# Patient Record
Sex: FEMALE | Race: WHITE | Employment: STUDENT | URBAN - METROPOLITAN AREA
[De-identification: names, ages, dates, MRNs, and addresses within clinical notes are randomized per-mention and may not be internally consistent; named-entity substitution may affect disease eponyms.]

---
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
## 2023-01-31 ENCOUNTER — HOSPITAL ENCOUNTER (OUTPATIENT)
Age: 18
Discharge: HOME | End: 2023-01-31
Payer: COMMERCIAL

## 2023-01-31 VITALS
WEIGHT: 115 LBS | TEMPERATURE: 98 F | SYSTOLIC BLOOD PRESSURE: 120 MMHG | BODY MASS INDEX: 21 KG/M2 | RESPIRATION RATE: 18 BRPM | OXYGEN SATURATION: 98 % | DIASTOLIC BLOOD PRESSURE: 58 MMHG | HEART RATE: 100 BPM

## 2023-01-31 DIAGNOSIS — J03.90 TONSILLITIS: ICD-10-CM

## 2023-01-31 DIAGNOSIS — H66.93 BILATERAL OTITIS MEDIA, UNSPECIFIED OTITIS MEDIA TYPE: Primary | ICD-10-CM

## 2023-01-31 LAB
S PYO AG THROAT QL: NEGATIVE
SARS-COV-2 RNA RESP QL NAA+PROBE: NOT DETECTED

## 2023-01-31 PROCEDURE — 99214 OFFICE O/P EST MOD 30 MIN: CPT

## 2023-01-31 PROCEDURE — 87880 STREP A ASSAY W/OPTIC: CPT

## 2023-01-31 PROCEDURE — 99213 OFFICE O/P EST LOW 20 MIN: CPT

## 2023-01-31 RX ORDER — DEXAMETHASONE 4 MG/1
12 TABLET ORAL ONCE
Status: COMPLETED | OUTPATIENT
Start: 2023-01-31 | End: 2023-01-31

## 2023-01-31 RX ORDER — CEFDINIR 300 MG/1
300 CAPSULE ORAL 2 TIMES DAILY
Qty: 20 CAPSULE | Refills: 0 | Status: SHIPPED | OUTPATIENT
Start: 2023-01-31 | End: 2023-02-10

## 2023-01-31 NOTE — DISCHARGE INSTRUCTIONS
Take antibiotic as directed. Tylenol and Motrin alternating. The steroid will stay in your system for the next 3 to 4 days. Salt water gargles and throat lozenges as needed. You may also take Mucinex as needed for congestion and cough.

## 2023-02-23 ENCOUNTER — OFFICE VISIT (OUTPATIENT)
Dept: OBGYN CLINIC | Facility: CLINIC | Age: 18
End: 2023-02-23

## 2023-02-23 VITALS
BODY MASS INDEX: 21.9 KG/M2 | WEIGHT: 119 LBS | HEIGHT: 62 IN | SYSTOLIC BLOOD PRESSURE: 111 MMHG | HEART RATE: 79 BPM | DIASTOLIC BLOOD PRESSURE: 76 MMHG

## 2023-02-23 DIAGNOSIS — Z11.3 SCREEN FOR STD (SEXUALLY TRANSMITTED DISEASE): Primary | ICD-10-CM

## 2023-02-23 PROCEDURE — 3078F DIAST BP <80 MM HG: CPT | Performed by: OBSTETRICS & GYNECOLOGY

## 2023-02-23 PROCEDURE — 3008F BODY MASS INDEX DOCD: CPT | Performed by: OBSTETRICS & GYNECOLOGY

## 2023-02-23 PROCEDURE — 3074F SYST BP LT 130 MM HG: CPT | Performed by: OBSTETRICS & GYNECOLOGY

## 2023-02-23 PROCEDURE — 99213 OFFICE O/P EST LOW 20 MIN: CPT | Performed by: OBSTETRICS & GYNECOLOGY

## 2023-02-24 ENCOUNTER — TELEPHONE (OUTPATIENT)
Dept: OBGYN CLINIC | Facility: CLINIC | Age: 18
End: 2023-02-24

## 2023-02-24 DIAGNOSIS — Z11.3 SCREENING EXAMINATION FOR STD (SEXUALLY TRANSMITTED DISEASE): Primary | ICD-10-CM

## 2023-02-24 LAB
C TRACH DNA SPEC QL NAA+PROBE: NEGATIVE
N GONORRHOEA DNA SPEC QL NAA+PROBE: NEGATIVE

## 2023-02-24 RX ORDER — METRONIDAZOLE 7.5 MG/G
1 GEL VAGINAL NIGHTLY
Qty: 70 G | Refills: 0 | Status: SHIPPED | OUTPATIENT
Start: 2023-02-24 | End: 2023-03-03

## 2023-02-24 RX ORDER — FLUCONAZOLE 150 MG/1
150 TABLET ORAL ONCE
Qty: 1 TABLET | Refills: 0 | Status: SHIPPED | OUTPATIENT
Start: 2023-02-24 | End: 2023-02-24

## 2023-02-24 NOTE — TELEPHONE ENCOUNTER
Pt Name and  verified. Patient informed and verbalized understanding. Scripts sent to confirmed pharmacy.

## 2023-02-24 NOTE — TELEPHONE ENCOUNTER
----- Message from Mikle Kehr, MD sent at 2/24/2023 12:52 PM CST -----  Patient has BV and YEAST - treat with metrogel pv x 7 days or flagyl 500mg po bid x 7 days (avoid alcohol with oral treatment). Either rx per patient preference. Patient also has yeast -  Give Rx Diflucan unless in the first trimester then have patient take otc monistat 7 day.      Mikle Kehr, MD

## 2023-05-22 ENCOUNTER — HOSPITAL ENCOUNTER (OUTPATIENT)
Age: 18
Discharge: HOME | End: 2023-05-22
Attending: EMERGENCY MEDICINE
Payer: COMMERCIAL

## 2023-05-22 ENCOUNTER — APPOINTMENT (OUTPATIENT)
Dept: GENERAL RADIOLOGY | Age: 18
End: 2023-05-22
Attending: EMERGENCY MEDICINE
Payer: COMMERCIAL

## 2023-05-22 VITALS
HEIGHT: 62 IN | DIASTOLIC BLOOD PRESSURE: 83 MMHG | BODY MASS INDEX: 22.08 KG/M2 | TEMPERATURE: 98 F | OXYGEN SATURATION: 100 % | RESPIRATION RATE: 15 BRPM | SYSTOLIC BLOOD PRESSURE: 123 MMHG | HEART RATE: 71 BPM | WEIGHT: 120 LBS

## 2023-05-22 DIAGNOSIS — S60.00XA CONTUSION OF FINGER OF LEFT HAND, UNSPECIFIED FINGER, INITIAL ENCOUNTER: Primary | ICD-10-CM

## 2023-05-22 DIAGNOSIS — R55 SYNCOPE, VASOVAGAL: ICD-10-CM

## 2023-05-22 LAB
#MXD IC: 0.6 X10ˆ3/UL (ref 0.1–1)
BUN BLD-MCNC: 11 MG/DL (ref 7–18)
CHLORIDE BLD-SCNC: 99 MMOL/L (ref 98–112)
CO2 BLD-SCNC: 31 MMOL/L (ref 21–32)
CREAT BLD-MCNC: 1.1 MG/DL
GFR SERPLBLD BASED ON 1.73 SQ M-ARVRAT: 74 ML/MIN/1.73M2 (ref 60–?)
GLUCOSE BLD-MCNC: 86 MG/DL (ref 70–99)
HCT VFR BLD AUTO: 45.1 %
HCT VFR BLD CALC: 45 %
HGB BLD-MCNC: 14.4 G/DL
ISTAT IONIZED CALCIUM FOR CHEM 8: 1.27 MMOL/L (ref 1.12–1.32)
LYMPHOCYTES # BLD AUTO: 1.7 X10ˆ3/UL (ref 1.5–5)
LYMPHOCYTES NFR BLD AUTO: 19.6 %
MCH RBC QN AUTO: 29.8 PG (ref 26–34)
MCHC RBC AUTO-ENTMCNC: 31.9 G/DL (ref 31–37)
MCV RBC AUTO: 93.2 FL (ref 80–100)
MIXED CELL %: 7.1 %
NEUTROPHILS # BLD AUTO: 6.4 X10ˆ3/UL (ref 1.5–7.7)
NEUTROPHILS NFR BLD AUTO: 73.3 %
PLATELET # BLD AUTO: 180 X10ˆ3/UL (ref 150–450)
POTASSIUM BLD-SCNC: 3.8 MMOL/L (ref 3.6–5.1)
RBC # BLD AUTO: 4.84 X10ˆ6/UL
SODIUM BLD-SCNC: 139 MMOL/L (ref 136–145)
WBC # BLD AUTO: 8.7 X10ˆ3/UL (ref 4–11)

## 2023-05-22 PROCEDURE — 80047 BASIC METABLC PNL IONIZED CA: CPT

## 2023-05-22 PROCEDURE — 73140 X-RAY EXAM OF FINGER(S): CPT | Performed by: EMERGENCY MEDICINE

## 2023-05-22 PROCEDURE — 93005 ELECTROCARDIOGRAM TRACING: CPT

## 2023-05-22 PROCEDURE — 85025 COMPLETE CBC W/AUTO DIFF WBC: CPT | Performed by: EMERGENCY MEDICINE

## 2023-05-23 LAB
ATRIAL RATE: 61 BPM
P AXIS: 38 DEGREES
P-R INTERVAL: 174 MS
Q-T INTERVAL: 398 MS
QRS DURATION: 88 MS
QTC CALCULATION (BEZET): 400 MS
R AXIS: 98 DEGREES
T AXIS: 63 DEGREES
VENTRICULAR RATE: 61 BPM

## 2023-05-23 NOTE — DISCHARGE INSTRUCTIONS
Push fluids  Rest  Elevate your injured extremity  Apply cool compresses for 20 minutes at a time.   Tylenol or motrin for pain    Follow up with your doctor within a few days
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
## 2023-06-22 ENCOUNTER — OFFICE VISIT (OUTPATIENT)
Dept: OBGYN CLINIC | Facility: CLINIC | Age: 18
End: 2023-06-22

## 2023-06-22 ENCOUNTER — LAB ENCOUNTER (OUTPATIENT)
Dept: LAB | Age: 18
End: 2023-06-22
Attending: OBSTETRICS & GYNECOLOGY
Payer: COMMERCIAL

## 2023-06-22 VITALS
SYSTOLIC BLOOD PRESSURE: 115 MMHG | DIASTOLIC BLOOD PRESSURE: 74 MMHG | WEIGHT: 113.81 LBS | BODY MASS INDEX: 20.94 KG/M2 | HEIGHT: 62 IN

## 2023-06-22 DIAGNOSIS — Z11.3 SCREEN FOR STD (SEXUALLY TRANSMITTED DISEASE): Primary | ICD-10-CM

## 2023-06-22 DIAGNOSIS — N89.8 VAGINAL DISCHARGE: ICD-10-CM

## 2023-06-22 DIAGNOSIS — Z11.3 SCREEN FOR STD (SEXUALLY TRANSMITTED DISEASE): ICD-10-CM

## 2023-06-22 LAB
HBV SURFACE AG SER-ACNC: <0.1 [IU]/L
HBV SURFACE AG SERPL QL IA: NONREACTIVE
HCV AB SERPL QL IA: NONREACTIVE
T PALLIDUM AB SER QL IA: NONREACTIVE

## 2023-06-22 PROCEDURE — 3008F BODY MASS INDEX DOCD: CPT | Performed by: OBSTETRICS & GYNECOLOGY

## 2023-06-22 PROCEDURE — 87340 HEPATITIS B SURFACE AG IA: CPT

## 2023-06-22 PROCEDURE — 99213 OFFICE O/P EST LOW 20 MIN: CPT | Performed by: OBSTETRICS & GYNECOLOGY

## 2023-06-22 PROCEDURE — 87591 N.GONORRHOEAE DNA AMP PROB: CPT

## 2023-06-22 PROCEDURE — 3074F SYST BP LT 130 MM HG: CPT | Performed by: OBSTETRICS & GYNECOLOGY

## 2023-06-22 PROCEDURE — 87491 CHLMYD TRACH DNA AMP PROBE: CPT

## 2023-06-22 PROCEDURE — 87210 SMEAR WET MOUNT SALINE/INK: CPT | Performed by: OBSTETRICS & GYNECOLOGY

## 2023-06-22 PROCEDURE — 86780 TREPONEMA PALLIDUM: CPT

## 2023-06-22 PROCEDURE — 87389 HIV-1 AG W/HIV-1&-2 AB AG IA: CPT

## 2023-06-22 PROCEDURE — 3078F DIAST BP <80 MM HG: CPT | Performed by: OBSTETRICS & GYNECOLOGY

## 2023-06-22 PROCEDURE — 86803 HEPATITIS C AB TEST: CPT

## 2023-06-23 LAB
C TRACH DNA SPEC QL NAA+PROBE: NEGATIVE
N GONORRHOEA DNA SPEC QL NAA+PROBE: NEGATIVE

## 2023-06-29 ENCOUNTER — TELEPHONE (OUTPATIENT)
Dept: OBGYN CLINIC | Facility: CLINIC | Age: 18
End: 2023-06-29

## 2023-06-29 RX ORDER — METRONIDAZOLE 7.5 MG/G
1 GEL VAGINAL NIGHTLY
Qty: 70 G | Refills: 0 | Status: SHIPPED | OUTPATIENT
Start: 2023-06-29 | End: 2023-07-06

## 2023-07-06 ENCOUNTER — HOSPITAL ENCOUNTER (EMERGENCY)
Facility: HOSPITAL | Age: 18
Discharge: HOME | End: 2023-07-06
Attending: EMERGENCY MEDICINE
Payer: COMMERCIAL

## 2023-07-06 VITALS
DIASTOLIC BLOOD PRESSURE: 84 MMHG | HEART RATE: 88 BPM | SYSTOLIC BLOOD PRESSURE: 127 MMHG | OXYGEN SATURATION: 100 % | TEMPERATURE: 98 F | WEIGHT: 110 LBS | BODY MASS INDEX: 20 KG/M2 | RESPIRATION RATE: 18 BRPM

## 2023-07-06 DIAGNOSIS — L25.9 CONTACT DERMATITIS, UNSPECIFIED CONTACT DERMATITIS TYPE, UNSPECIFIED TRIGGER: Primary | ICD-10-CM

## 2023-07-06 DIAGNOSIS — L50.9 URTICARIA: ICD-10-CM

## 2023-07-06 PROCEDURE — 96372 THER/PROPH/DIAG INJ SC/IM: CPT

## 2023-07-06 PROCEDURE — 99283 EMERGENCY DEPT VISIT LOW MDM: CPT

## 2023-07-06 PROCEDURE — 99285 EMERGENCY DEPT VISIT HI MDM: CPT

## 2023-07-06 RX ORDER — METHYLPREDNISOLONE 4 MG/1
TABLET ORAL
Qty: 21 TABLET | Refills: 0 | Status: SHIPPED | OUTPATIENT
Start: 2023-07-06

## 2023-07-06 RX ORDER — DEXAMETHASONE 4 MG/1
16 TABLET ORAL ONCE
Status: COMPLETED | OUTPATIENT
Start: 2023-07-06 | End: 2023-07-06

## 2023-07-06 RX ORDER — CETIRIZINE HYDROCHLORIDE 10 MG/1
10 TABLET ORAL ONCE
Status: COMPLETED | OUTPATIENT
Start: 2023-07-06 | End: 2023-07-06

## 2023-07-06 RX ORDER — FAMOTIDINE 20 MG/1
20 TABLET, FILM COATED ORAL ONCE
Status: COMPLETED | OUTPATIENT
Start: 2023-07-06 | End: 2023-07-06

## 2023-07-06 RX ORDER — CETIRIZINE HYDROCHLORIDE 10 MG/1
10 TABLET ORAL DAILY
Qty: 30 TABLET | Refills: 0 | Status: SHIPPED | OUTPATIENT
Start: 2023-07-06 | End: 2023-08-05

## 2023-07-06 NOTE — DISCHARGE INSTRUCTIONS
Zyrtec 10 mg twice a day for the next 3 days then daily as needed. Medrol Dosepak use as directed. Start tomorrow morning. Go home and bathe and wash all the skin irrigate with clean water to be sure there is no residual chemical contact. Follow-up with PMD if not improved in 48 hours. Return immediately if symptoms worsen or other concerns develop.

## 2023-07-06 NOTE — ED INITIAL ASSESSMENT (HPI)
Unknown allergic reaction . Currently in cosmetology school and thinks she may have gotten bleach on her skin.

## 2023-07-27 ENCOUNTER — OFFICE VISIT (OUTPATIENT)
Dept: INTERNAL MEDICINE CLINIC | Facility: CLINIC | Age: 18
End: 2023-07-27
Payer: COMMERCIAL

## 2023-07-27 VITALS
SYSTOLIC BLOOD PRESSURE: 96 MMHG | DIASTOLIC BLOOD PRESSURE: 64 MMHG | HEART RATE: 79 BPM | TEMPERATURE: 98 F | HEIGHT: 62 IN | WEIGHT: 116.38 LBS | RESPIRATION RATE: 14 BRPM | BODY MASS INDEX: 21.42 KG/M2 | OXYGEN SATURATION: 98 %

## 2023-07-27 DIAGNOSIS — R11.2 NAUSEA AND VOMITING, UNSPECIFIED VOMITING TYPE: Primary | ICD-10-CM

## 2023-07-27 DIAGNOSIS — F43.21 GRIEVING: ICD-10-CM

## 2023-07-27 DIAGNOSIS — F41.9 ANXIETY: ICD-10-CM

## 2023-07-27 PROCEDURE — 99214 OFFICE O/P EST MOD 30 MIN: CPT | Performed by: FAMILY MEDICINE

## 2023-07-27 PROCEDURE — 3078F DIAST BP <80 MM HG: CPT | Performed by: FAMILY MEDICINE

## 2023-07-27 PROCEDURE — 3008F BODY MASS INDEX DOCD: CPT | Performed by: FAMILY MEDICINE

## 2023-07-27 PROCEDURE — 3074F SYST BP LT 130 MM HG: CPT | Performed by: FAMILY MEDICINE

## 2023-07-27 RX ORDER — PANTOPRAZOLE SODIUM 40 MG/1
TABLET, DELAYED RELEASE ORAL
Qty: 90 TABLET | Refills: 0 | Status: SHIPPED | OUTPATIENT
Start: 2023-07-27

## 2023-07-27 RX ORDER — ONDANSETRON 4 MG/1
4 TABLET, ORALLY DISINTEGRATING ORAL EVERY 4 HOURS PRN
Qty: 30 TABLET | Refills: 0 | Status: SHIPPED | OUTPATIENT
Start: 2023-07-27

## 2023-09-25 ENCOUNTER — HOSPITAL ENCOUNTER (OUTPATIENT)
Age: 18
Discharge: HOME | End: 2023-09-25
Payer: COMMERCIAL

## 2023-09-25 VITALS
TEMPERATURE: 98 F | WEIGHT: 120 LBS | RESPIRATION RATE: 18 BRPM | HEART RATE: 88 BPM | OXYGEN SATURATION: 99 % | SYSTOLIC BLOOD PRESSURE: 135 MMHG | BODY MASS INDEX: 22 KG/M2 | DIASTOLIC BLOOD PRESSURE: 72 MMHG

## 2023-09-25 DIAGNOSIS — B34.9 VIRAL SYNDROME: Primary | ICD-10-CM

## 2023-09-25 LAB — SARS-COV-2 RNA RESP QL NAA+PROBE: NOT DETECTED

## 2023-09-25 PROCEDURE — 99212 OFFICE O/P EST SF 10 MIN: CPT

## 2023-09-25 PROCEDURE — 99213 OFFICE O/P EST LOW 20 MIN: CPT

## 2023-10-08 RX ORDER — ALBUTEROL SULFATE 90 UG/1
2 AEROSOL, METERED RESPIRATORY (INHALATION) EVERY 4 HOURS PRN
Qty: 1 EACH | Refills: 0 | Status: SHIPPED | OUTPATIENT
Start: 2023-10-08

## 2023-12-23 ENCOUNTER — HOSPITAL ENCOUNTER (OUTPATIENT)
Age: 18
Discharge: HOME | End: 2023-12-23
Attending: EMERGENCY MEDICINE
Payer: COMMERCIAL

## 2023-12-23 VITALS
OXYGEN SATURATION: 97 % | HEART RATE: 66 BPM | HEIGHT: 62 IN | TEMPERATURE: 98 F | BODY MASS INDEX: 22.08 KG/M2 | DIASTOLIC BLOOD PRESSURE: 80 MMHG | WEIGHT: 120 LBS | RESPIRATION RATE: 18 BRPM | SYSTOLIC BLOOD PRESSURE: 128 MMHG

## 2023-12-23 DIAGNOSIS — J01.00 ACUTE MAXILLARY SINUSITIS, RECURRENCE NOT SPECIFIED: Primary | ICD-10-CM

## 2023-12-23 LAB — SARS-COV-2 RNA RESP QL NAA+PROBE: NOT DETECTED

## 2023-12-23 PROCEDURE — 99214 OFFICE O/P EST MOD 30 MIN: CPT

## 2023-12-23 PROCEDURE — 99213 OFFICE O/P EST LOW 20 MIN: CPT

## 2023-12-23 RX ORDER — AZITHROMYCIN 250 MG/1
TABLET, FILM COATED ORAL
Qty: 6 TABLET | Refills: 0 | Status: SHIPPED | OUTPATIENT
Start: 2023-12-23 | End: 2023-12-28

## 2023-12-23 NOTE — DISCHARGE INSTRUCTIONS
Follow-up with your primary care doctor as needed  Take over-the-counter decongestant as needed  Take Zithromax for course of 5 days  Return if any worsening symptoms or new concerns

## 2023-12-23 NOTE — ED INITIAL ASSESSMENT (HPI)
C/o sinus problem for 2 days. Pt reports runny/stuffy nose, productive cough with yellow sputum, chest congestion. Pt denies fever, anyone else sick, travel and otc meds.

## 2024-02-09 ENCOUNTER — LAB ENCOUNTER (OUTPATIENT)
Dept: LAB | Age: 19
End: 2024-02-09
Payer: COMMERCIAL

## 2024-02-09 ENCOUNTER — OFFICE VISIT (OUTPATIENT)
Dept: OBGYN CLINIC | Facility: CLINIC | Age: 19
End: 2024-02-09
Payer: COMMERCIAL

## 2024-02-09 VITALS
HEIGHT: 62 IN | HEART RATE: 72 BPM | SYSTOLIC BLOOD PRESSURE: 118 MMHG | WEIGHT: 112.44 LBS | DIASTOLIC BLOOD PRESSURE: 77 MMHG | BODY MASS INDEX: 20.69 KG/M2

## 2024-02-09 DIAGNOSIS — Z11.3 SCREENING EXAMINATION FOR STI: ICD-10-CM

## 2024-02-09 DIAGNOSIS — Z11.3 SCREENING EXAMINATION FOR STI: Primary | ICD-10-CM

## 2024-02-09 PROCEDURE — 87491 CHLMYD TRACH DNA AMP PROBE: CPT

## 2024-02-09 PROCEDURE — 87340 HEPATITIS B SURFACE AG IA: CPT

## 2024-02-09 PROCEDURE — 87389 HIV-1 AG W/HIV-1&-2 AB AG IA: CPT

## 2024-02-09 PROCEDURE — 86780 TREPONEMA PALLIDUM: CPT

## 2024-02-09 PROCEDURE — 87591 N.GONORRHOEAE DNA AMP PROB: CPT

## 2024-02-09 PROCEDURE — 86803 HEPATITIS C AB TEST: CPT

## 2024-02-09 NOTE — PROGRESS NOTES
Tere Burnett is a 18 year old female  No LMP recorded (lmp unknown). (Menstrual status: IUD - Intrauterine Device).   Chief Complaint   Patient presents with    New Patient    Std Screen     Unprotected sex recently, about a week ago     Vaginal Problem     Discharge and odor     Other     Unable to leave urine sample for GC/Chlam    .    OBSTETRICS HISTORY:  OB History    Para Term  AB Living   0 0 0 0 0 0   SAB IAB Ectopic Multiple Live Births   0 0 0 0 0       GYNE HISTORY:  Periods  none due to IUD      History   Sexual Activity    Sexual activity: Yes        Hx Prior Abnormal Pap: No        MEDICAL HISTORY:  Past Medical History:   Diagnosis Date    Abdominal distention     Abdominal pain     Anxiety     Arthritis     Back pain     Bloating     Body piercing     Chronic rhinitis     Constipation     Decorative tattoo     Diarrhea, unspecified     Endometriosis     Esophageal reflux     Food intolerance     Frequent urination     Frequent UTI     Heartburn     History of cardiac murmur     Indigestion     Irregular bowel habits     Kidney problem     currently under care of a urologist for Kidney reflux.     Leaking of urine     Loss of appetite     Menses painful     Nausea     Nausea and vomiting 12/10/2021    Pain in joints     Pain with bowel movements     Painful urination     Presence of other cardiac implants and grafts     Iud    Stool incontinence     Stress     Uncomfortable fullness after meals     Visual impairment     glasses    Vomiting     Wears glasses     Weight loss     Wheezing     When exercising       SURGICAL HISTORY:  Past Surgical History:   Procedure Laterality Date    Colonoscopy      Correct bunion,simple      Insert intrauterine device  10/12/2022    Kyleena    Other surgical history      Bilateral Bunionectomy    Other surgical history  10/26/2020    rbus Dr. Figueroa    Remove intrauterine device  10/12/2022    Shelly moctezuma       SOCIAL HISTORY:  Social  History     Socioeconomic History    Marital status: Single     Spouse name: Not on file    Number of children: Not on file    Years of education: Not on file    Highest education level: Not on file   Occupational History    Not on file   Tobacco Use    Smoking status: Never    Smokeless tobacco: Former   Vaping Use    Vaping Use: Every day   Substance and Sexual Activity    Alcohol use: Not Currently     Alcohol/week: 0.0 standard drinks of alcohol     Comment: Occasionally/ 2-3 seltzers when she does drink    Drug use: Yes     Frequency: 7.0 times per week     Types: Cannabis     Comment: everyday    Sexual activity: Yes   Other Topics Concern    Caffeine Concern Yes    Exercise Yes    Seat Belt Yes    Special Diet Not Asked    Stress Concern Not Asked    Weight Concern Not Asked     Service Not Asked    Blood Transfusions Not Asked    Occupational Exposure Not Asked    Hobby Hazards Not Asked    Sleep Concern Not Asked    Back Care Not Asked    Bike Helmet Not Asked    Self-Exams Not Asked   Social History Narrative    Not on file     Social Determinants of Health     Financial Resource Strain: Not on file   Food Insecurity: Not on file   Transportation Needs: Not on file   Physical Activity: Not on file   Stress: Not on file   Social Connections: Not on file   Housing Stability: Not on file       FAMILY HISTORY:  Family History   Problem Relation Age of Onset    Heart Disorder Maternal Grandmother     Heart Disorder Maternal Grandfather     Heart Disease Maternal Grandfather     Heart Attack Maternal Grandfather     Cancer Paternal Grandmother     Other Paternal Grandmother         Cervical cancer    Stroke Neg        MEDICATIONS:    Current Outpatient Medications:     albuterol 108 (90 Base) MCG/ACT Inhalation Aero Soln, Inhale 2 puffs into the lungs every 4 (four) hours as needed for Wheezing. PHYSICAL DUE, Disp: 1 each, Rfl: 0    ondansetron 4 MG Oral Tablet Dispersible, Take 1 tablet (4 mg total) by  mouth every 4 (four) hours as needed., Disp: 30 tablet, Rfl: 0    pantoprazole 40 MG Oral Tab EC, Take one tablet (40 mg total) by mouth once daily, 30 minutes prior to breakfast., Disp: 90 tablet, Rfl: 0    levonorgestrel (KYLEENA) 19.5 MG Intrauterine IUD, 19,500 mcg (1 each total) by Intrauterine route one time., Disp: , Rfl:     linaCLOtide (LINZESS) 72 MCG Oral Cap, Take 72 mcg by mouth daily., Disp: 90 capsule, Rfl: 0    ALLERGIES:    Allergies   Allergen Reactions    Penicillins RASH     Rash (not hives) with PCN; has taken a cephalosporin with no problem    Bananas ITCHING     Tongue itching    Pineapple ITCHING     Itchy tongue    Strawberries ITCHING     Itchy tongue         PHYSICAL EXAM:   Pelvic Exam:  External Genitalia: normal appearance, hair distribution, and no lesions  Urethral Meatus:  normal in size, location, without lesions and prolapse  Bladder:  No fullness, masses or tenderness  Vagina:  Normal appearance without lesions, no abnormal discharge  Cervix:  Normal without tenderness on motion  Uterus: normal in size, contour, position, mobility, without tenderness  Adnexa: normal without masses or tenderness  Perineum: normal  Anus: no hemorroids     Assessment & Plan:    1. Screening examination for STI    - Chlamydia/Gc Amplification; Future  - HIV AG AB COMBO; Future  - Hepatitis B Surface Antigen; Future  - T Pallidum Screening Cascade; Future  - HCV Antibody; Future

## 2024-02-12 LAB
C TRACH DNA SPEC QL NAA+PROBE: NEGATIVE
N GONORRHOEA DNA SPEC QL NAA+PROBE: NEGATIVE

## 2024-05-06 ENCOUNTER — OFFICE VISIT (OUTPATIENT)
Dept: OBGYN CLINIC | Facility: CLINIC | Age: 19
End: 2024-05-06
Payer: COMMERCIAL

## 2024-05-06 ENCOUNTER — LAB ENCOUNTER (OUTPATIENT)
Dept: LAB | Age: 19
End: 2024-05-06
Payer: COMMERCIAL

## 2024-05-06 VITALS
BODY MASS INDEX: 19.47 KG/M2 | SYSTOLIC BLOOD PRESSURE: 122 MMHG | DIASTOLIC BLOOD PRESSURE: 77 MMHG | HEIGHT: 62 IN | WEIGHT: 105.81 LBS | HEART RATE: 65 BPM

## 2024-05-06 DIAGNOSIS — Z11.3 SCREENING EXAMINATION FOR STI: ICD-10-CM

## 2024-05-06 DIAGNOSIS — N89.8 VAGINAL DISCHARGE: Primary | ICD-10-CM

## 2024-05-06 LAB
HBV SURFACE AB SER QL: NONREACTIVE
HBV SURFACE AB SERPL IA-ACNC: 4.27 MIU/ML
HCV AB SERPL QL IA: NONREACTIVE
T PALLIDUM AB SER QL IA: NONREACTIVE

## 2024-05-06 PROCEDURE — 86803 HEPATITIS C AB TEST: CPT

## 2024-05-06 PROCEDURE — 87591 N.GONORRHOEAE DNA AMP PROB: CPT

## 2024-05-06 PROCEDURE — 87491 CHLMYD TRACH DNA AMP PROBE: CPT

## 2024-05-06 PROCEDURE — 81514 NFCT DS BV&VAGINITIS DNA ALG: CPT

## 2024-05-06 PROCEDURE — 87389 HIV-1 AG W/HIV-1&-2 AB AG IA: CPT

## 2024-05-06 PROCEDURE — 86780 TREPONEMA PALLIDUM: CPT

## 2024-05-06 PROCEDURE — 86706 HEP B SURFACE ANTIBODY: CPT

## 2024-05-06 NOTE — PROGRESS NOTES
Tere Burnett is a 18 year old female  No LMP recorded (lmp unknown). (Menstrual status: IUD - Intrauterine Device).   Chief Complaint   Patient presents with    Sexually Transmitted Infection Screen     Was tested for STDs back in February but patient wants to be tested again for everything     Vaginal Problem     Unusual discharge, wants to be tested for BV and yeast     Other     Patient said YES to student in the room    .  She is requesting blood work STI testing as well.     OBSTETRICS HISTORY:  OB History    Para Term  AB Living   0 0 0 0 0 0   SAB IAB Ectopic Multiple Live Births   0 0 0 0 0       GYNE HISTORY:    History   Sexual Activity    Sexual activity: Yes                 MEDICAL HISTORY:  Past Medical History:    Abdominal distention    Abdominal pain    Anxiety    Arthritis    Back pain    Bloating    Body piercing    Chronic rhinitis    Constipation    Decorative tattoo    Diarrhea, unspecified    Endometriosis    Esophageal reflux    Food intolerance    Frequent urination    Frequent UTI    Heartburn    History of cardiac murmur    Indigestion    Irregular bowel habits    Kidney problem    currently under care of a urologist for Kidney reflux.     Leaking of urine    Loss of appetite    Menses painful    Nausea    Nausea and vomiting    Pain in joints    Pain with bowel movements    Painful urination    Presence of other cardiac implants and grafts    Iud    Stool incontinence    Stress    Uncomfortable fullness after meals    Visual impairment    glasses    Vomiting    Wears glasses    Weight loss    Wheezing    When exercising       SURGICAL HISTORY:  Past Surgical History:   Procedure Laterality Date    Colonoscopy      Correct bunion,simple      Insert intrauterine device  10/12/2022    Kyleena    Other surgical history      Bilateral Bunionectomy    Other surgical history  10/26/2020    rbus Dr. Figueroa    Remove intrauterine device  10/12/2022    Shelly removed       SOCIAL  HISTORY:  Social History     Socioeconomic History    Marital status: Single     Spouse name: Not on file    Number of children: Not on file    Years of education: Not on file    Highest education level: Not on file   Occupational History    Not on file   Tobacco Use    Smoking status: Never    Smokeless tobacco: Former   Vaping Use    Vaping status: Every Day   Substance and Sexual Activity    Alcohol use: Not Currently     Alcohol/week: 0.0 standard drinks of alcohol     Comment: Occasionally/ 2-3 seltzers when she does drink    Drug use: Yes     Frequency: 7.0 times per week     Types: Cannabis     Comment: everyday    Sexual activity: Yes   Other Topics Concern    Caffeine Concern Yes    Exercise Yes    Seat Belt Yes    Special Diet Not Asked    Stress Concern Not Asked    Weight Concern Not Asked     Service Not Asked    Blood Transfusions Not Asked    Occupational Exposure Not Asked    Hobby Hazards Not Asked    Sleep Concern Not Asked    Back Care Not Asked    Bike Helmet Not Asked    Self-Exams Not Asked   Social History Narrative    Not on file     Social Determinants of Health     Financial Resource Strain: Not on file   Food Insecurity: Not on file   Transportation Needs: Not on file   Physical Activity: Not on file   Stress: Not on file   Social Connections: Not on file   Housing Stability: At Risk (8/18/2023)    Received from [facility]     Living Situation: Not on file     Housing Problems: Not on file       FAMILY HISTORY:  Family History   Problem Relation Age of Onset    Heart Disorder Maternal Grandmother     Heart Disorder Maternal Grandfather     Heart Disease Maternal Grandfather     Heart Attack Maternal Grandfather     Cancer Paternal Grandmother     Other Paternal Grandmother         Cervical cancer    Stroke Neg        MEDICATIONS:    Current Outpatient Medications:     albuterol 108 (90 Base) MCG/ACT Inhalation Aero Soln, Inhale 2 puffs into the lungs every 4 (four)  hours as needed for Wheezing. PHYSICAL DUE, Disp: 1 each, Rfl: 0    ondansetron 4 MG Oral Tablet Dispersible, Take 1 tablet (4 mg total) by mouth every 4 (four) hours as needed., Disp: 30 tablet, Rfl: 0    pantoprazole 40 MG Oral Tab EC, Take one tablet (40 mg total) by mouth once daily, 30 minutes prior to breakfast., Disp: 90 tablet, Rfl: 0    levonorgestrel (KYLEENA) 19.5 MG Intrauterine IUD, 19,500 mcg (1 each total) by Intrauterine route one time., Disp: , Rfl:     linaCLOtide (LINZESS) 72 MCG Oral Cap, Take 72 mcg by mouth daily., Disp: 90 capsule, Rfl: 0    ALLERGIES:    Allergies   Allergen Reactions    Penicillins RASH     Rash (not hives) with PCN; has taken a cephalosporin with no problem    Bananas ITCHING     Tongue itching    Pineapple ITCHING     Itchy tongue    Strawberries ITCHING     Itchy tongue         PHYSICAL EXAM:   Pelvic Exam:  External Genitalia: normal appearance, hair distribution, and no lesions  Urethral Meatus:  normal in size, location, without lesions and prolapse  Bladder:  No fullness, masses or tenderness  Vagina:  Normal appearance without lesions, no abnormal discharge  Cervix:  Normal without tenderness on motion  Uterus: normal in size, contour, position, mobility, without tenderness  Adnexa: normal without masses or tenderness  Perineum: normal  Anus: no hemorroids     Assessment & Plan:  . Vaginal discharge    - Vaginitis Vaginosis PCR Panel; Future    2. Screening examination for STI    - Chlamydia/Gc Amplification; Future  - HIV AG AB COMBO; Future  - T Pallidum Screening Cascade; Future  - Hepatitis B Surface Antibody; Future  - HCV Antibody; Future

## 2024-05-07 LAB
BV BACTERIA DNA VAG QL NAA+PROBE: POSITIVE
C GLABRATA DNA VAG QL NAA+PROBE: NEGATIVE
C KRUSEI DNA VAG QL NAA+PROBE: NEGATIVE
C TRACH DNA SPEC QL NAA+PROBE: NEGATIVE
CANDIDA DNA VAG QL NAA+PROBE: NEGATIVE
N GONORRHOEA DNA SPEC QL NAA+PROBE: NEGATIVE
T VAGINALIS DNA VAG QL NAA+PROBE: POSITIVE

## 2024-05-07 NOTE — PROGRESS NOTES
Pt aware of results & recommendations to follow up with PCP her hepatitis B vaccine. Verbalized understanding.

## 2024-05-08 RX ORDER — METRONIDAZOLE 500 MG/1
500 TABLET ORAL 2 TIMES DAILY
Qty: 14 TABLET | Refills: 0 | Status: SHIPPED | OUTPATIENT
Start: 2024-05-08

## 2024-11-25 ENCOUNTER — OFFICE VISIT (OUTPATIENT)
Dept: OBGYN CLINIC | Facility: CLINIC | Age: 19
End: 2024-11-25
Payer: COMMERCIAL

## 2024-11-25 ENCOUNTER — LAB ENCOUNTER (OUTPATIENT)
Dept: LAB | Age: 19
End: 2024-11-25
Payer: COMMERCIAL

## 2024-11-25 VITALS
BODY MASS INDEX: 20.18 KG/M2 | SYSTOLIC BLOOD PRESSURE: 112 MMHG | DIASTOLIC BLOOD PRESSURE: 76 MMHG | WEIGHT: 109.63 LBS | HEART RATE: 81 BPM | HEIGHT: 62 IN

## 2024-11-25 DIAGNOSIS — N89.8 VAGINAL DISCHARGE: ICD-10-CM

## 2024-11-25 DIAGNOSIS — Z11.3 SCREENING EXAMINATION FOR STI: ICD-10-CM

## 2024-11-25 DIAGNOSIS — N63.15 BREAST LUMP ON RIGHT SIDE AT 9 O'CLOCK POSITION: Primary | ICD-10-CM

## 2024-11-25 LAB
HBV SURFACE AB SER QL: NONREACTIVE
HBV SURFACE AB SERPL IA-ACNC: 3.92 MIU/ML
HCV AB SERPL QL IA: NONREACTIVE
T PALLIDUM AB SER QL IA: NONREACTIVE

## 2024-11-25 PROCEDURE — 87389 HIV-1 AG W/HIV-1&-2 AB AG IA: CPT

## 2024-11-25 PROCEDURE — 86803 HEPATITIS C AB TEST: CPT

## 2024-11-25 PROCEDURE — 87491 CHLMYD TRACH DNA AMP PROBE: CPT

## 2024-11-25 PROCEDURE — 86706 HEP B SURFACE ANTIBODY: CPT

## 2024-11-25 PROCEDURE — 81514 NFCT DS BV&VAGINITIS DNA ALG: CPT

## 2024-11-25 PROCEDURE — 87591 N.GONORRHOEAE DNA AMP PROB: CPT

## 2024-11-25 PROCEDURE — 86780 TREPONEMA PALLIDUM: CPT

## 2024-11-25 NOTE — PROGRESS NOTES
Tere Burnett is a 19 year old female  No LMP recorded (lmp unknown). (Menstrual status: IUD - Intrauterine Device).   Chief Complaint   Patient presents with    Sexually Transmitted Infection Screen     - Patient would like STD testing today     Gyn Problem     Patient reports increase in discharge, sometimes a little pinkish for about 1-2 weeks    Breast Problem     Patient reports left breast is larger than right breast, noticed it more recently after losing weight.    .    OBSTETRICS HISTORY:  OB History    Para Term  AB Living   0 0 0 0 0 0   SAB IAB Ectopic Multiple Live Births   0 0 0 0 0       GYNE HISTORY:      History   Sexual Activity    Sexual activity: Yes                 MEDICAL HISTORY:  Past Medical History:    Abdominal distention    Abdominal pain    Abnormal uterine bleeding    Amenorrhea    Iud    Anxiety    Arthritis    Back pain    Bloating    Body piercing    Chronic rhinitis    Constipation    Decorative tattoo    Diarrhea, unspecified    Dyspareunia    Endometriosis    Esophageal reflux    Food intolerance    Frequent urination    Frequent UTI    Heart murmur    Resolved at 3 months    Heartburn    History of cardiac murmur    Indigestion    Irregular bowel habits    Kidney problem    currently under care of a urologist for Kidney reflux.     Leaking of urine    Loss of appetite    Menses painful    Nausea    Nausea and vomiting    Pain in joints    Pain with bowel movements    Painful urination    Presence of other cardiac implants and grafts    Iud    Stool incontinence    Stress    Uncomfortable fullness after meals    Visual impairment    glasses    Vomiting    Wears glasses    Weight loss    Wheezing    When exercising       SURGICAL HISTORY:  Past Surgical History:   Procedure Laterality Date    Colonoscopy      Correct bunion,simple      Insert intrauterine device  10/12/2022    Kyleena    Other surgical history      Bilateral Bunionectomy    Other surgical  history  10/26/2020    rbus Dr. Figueroa    Remove intrauterine device  10/12/2022    Shelly removed       SOCIAL HISTORY:  Social History     Socioeconomic History    Marital status: Single     Spouse name: Not on file    Number of children: Not on file    Years of education: Not on file    Highest education level: Not on file   Occupational History    Not on file   Tobacco Use    Smoking status: Never    Smokeless tobacco: Former   Vaping Use    Vaping status: Every Day   Substance and Sexual Activity    Alcohol use: Not Currently     Comment: Occasionally/ 2-3 seltzers when she does drink    Drug use: Yes     Frequency: 7.0 times per week     Types: Cannabis     Comment: everyday    Sexual activity: Yes   Other Topics Concern    Caffeine Concern No    Exercise Yes    Seat Belt Yes    Special Diet No    Stress Concern No    Weight Concern Yes     Comment: Only 95lbs     Service Not Asked    Blood Transfusions Not Asked    Occupational Exposure Not Asked    Hobby Hazards Not Asked    Sleep Concern Not Asked    Back Care Not Asked    Bike Helmet Not Asked    Self-Exams Not Asked   Social History Narrative    Not on file     Social Drivers of Health     Financial Resource Strain: Not on file   Food Insecurity: Not on file   Transportation Needs: Not on file   Physical Activity: Not on file   Stress: Not on file   Social Connections: Not on file   Housing Stability: At Risk (8/18/2023)    Received [facility] [employer] [employer] [facility]     Living Situation: Not on file     Housing Problems: Not on file       FAMILY HISTORY:  Family History   Problem Relation Age of Onset    Heart Disorder Maternal Grandmother     Heart Disorder Maternal Grandfather     Heart Disease Maternal Grandfather     Heart Attack Maternal Grandfather     Cancer Paternal Grandmother     Stroke Neg        MEDICATIONS:    Current Outpatient Medications:     albuterol 108 (90 Base) MCG/ACT Inhalation Aero Soln, Inhale 2 puffs into the  lungs every 4 (four) hours as needed for Wheezing. PHYSICAL DUE, Disp: 1 each, Rfl: 0    pantoprazole 40 MG Oral Tab EC, Take one tablet (40 mg total) by mouth once daily, 30 minutes prior to breakfast., Disp: 90 tablet, Rfl: 0    levonorgestrel (KYLEENA) 19.5 MG Intrauterine IUD, 19,500 mcg (1 each total) by Intrauterine route one time., Disp: , Rfl:     linaCLOtide (LINZESS) 72 MCG Oral Cap, Take 72 mcg by mouth daily. (Patient not taking: Reported on 11/25/2024), Disp: 90 capsule, Rfl: 0    ALLERGIES:  Allergies[1]      PHYSICAL EXAM:     Physical Exam  Chest:   Breasts:     Right: Mass present. No swelling, bleeding, inverted nipple, nipple discharge, skin change or tenderness.      Left: No swelling, bleeding, inverted nipple, mass, nipple discharge, skin change or tenderness.          Comments: Lump palpated on right breast at the 9 clock position.             Pelvic Exam:  External Genitalia: normal appearance, hair distribution, and no lesions  Urethral Meatus:  normal in size, location, without lesions and prolapse  Bladder:  No fullness, masses or tenderness  Vagina:  Normal appearance without lesions, no abnormal discharge  Cervix:  Normal without tenderness on motion, unable to see IUD strings, able to palpate IUD   Uterus: normal in size, contour, position, mobility, without tenderness  Adnexa: normal without masses or tenderness  Perineum: normal  Anus: no hemorroids     Assessment & Plan:    1. Breast lump on right side at 9 o'clock position    - US BREAST RIGHT COMPLETE (CPT=76641); Future    2. Screening examination for STI    - HIV AG AB COMBO; Future  - T Pallidum Screening Cascade; Future  - Hepatitis B Surface Antibody; Future  - HCV Antibody; Future  - Chlamydia/Gc Amplification; Future    3. Vaginal discharge    - Vaginitis Vaginosis PCR Panel; Future                 [1]   Allergies  Allergen Reactions    Penicillins RASH     Rash (not hives) with PCN; has taken a cephalosporin with no problem     Bananas ITCHING     Tongue itching    Pineapple ITCHING     Itchy tongue    Strawberries ITCHING     Itchy tongue

## 2024-11-26 LAB
BV BACTERIA DNA VAG QL NAA+PROBE: NEGATIVE
C GLABRATA DNA VAG QL NAA+PROBE: NEGATIVE
C KRUSEI DNA VAG QL NAA+PROBE: NEGATIVE
C TRACH DNA SPEC QL NAA+PROBE: NEGATIVE
CANDIDA DNA VAG QL NAA+PROBE: POSITIVE
N GONORRHOEA DNA SPEC QL NAA+PROBE: NEGATIVE
T VAGINALIS DNA VAG QL NAA+PROBE: NEGATIVE

## 2024-11-26 RX ORDER — FLUCONAZOLE 150 MG/1
TABLET ORAL
Qty: 2 TABLET | Refills: 0 | Status: SHIPPED | OUTPATIENT
Start: 2024-11-26 | End: 2024-12-02 | Stop reason: ALTCHOICE

## 2024-12-02 ENCOUNTER — OFFICE VISIT (OUTPATIENT)
Dept: INTERNAL MEDICINE CLINIC | Facility: CLINIC | Age: 19
End: 2024-12-02
Payer: COMMERCIAL

## 2024-12-02 ENCOUNTER — TELEPHONE (OUTPATIENT)
Dept: INTERNAL MEDICINE CLINIC | Facility: CLINIC | Age: 19
End: 2024-12-02

## 2024-12-02 VITALS
DIASTOLIC BLOOD PRESSURE: 50 MMHG | SYSTOLIC BLOOD PRESSURE: 106 MMHG | HEART RATE: 80 BPM | OXYGEN SATURATION: 97 % | TEMPERATURE: 98 F | WEIGHT: 110.19 LBS | RESPIRATION RATE: 18 BRPM | HEIGHT: 62 IN | BODY MASS INDEX: 20.28 KG/M2

## 2024-12-02 DIAGNOSIS — B37.31 YEAST VAGINITIS: ICD-10-CM

## 2024-12-02 DIAGNOSIS — Z23 NEED FOR HEPATITIS B VACCINATION: ICD-10-CM

## 2024-12-02 DIAGNOSIS — Z00.00 ROUTINE GENERAL MEDICAL EXAMINATION AT A HEALTH CARE FACILITY: Primary | ICD-10-CM

## 2024-12-02 RX ORDER — FLUCONAZOLE 150 MG/1
150 TABLET ORAL DAILY
Qty: 3 TABLET | Refills: 0 | Status: SHIPPED | OUTPATIENT
Start: 2024-12-02

## 2024-12-09 ENCOUNTER — LAB ENCOUNTER (OUTPATIENT)
Dept: LAB | Age: 19
End: 2024-12-09
Attending: FAMILY MEDICINE
Payer: COMMERCIAL

## 2024-12-09 DIAGNOSIS — Z00.00 ROUTINE GENERAL MEDICAL EXAMINATION AT A HEALTH CARE FACILITY: ICD-10-CM

## 2024-12-09 LAB
ALBUMIN SERPL-MCNC: 4.4 G/DL (ref 3.2–4.8)
ALBUMIN/GLOB SERPL: 1.3 {RATIO} (ref 1–2)
ALP LIVER SERPL-CCNC: 64 U/L
ALT SERPL-CCNC: 16 U/L
ANION GAP SERPL CALC-SCNC: 8 MMOL/L (ref 0–18)
AST SERPL-CCNC: 24 U/L (ref ?–34)
BASOPHILS # BLD AUTO: 0.04 X10(3) UL (ref 0–0.2)
BASOPHILS NFR BLD AUTO: 0.7 %
BILIRUB SERPL-MCNC: 1.1 MG/DL (ref 0.3–1.2)
BUN BLD-MCNC: 14 MG/DL (ref 9–23)
CALCIUM BLD-MCNC: 10.3 MG/DL (ref 8.7–10.4)
CHLORIDE SERPL-SCNC: 107 MMOL/L (ref 98–112)
CHOLEST SERPL-MCNC: 123 MG/DL (ref ?–200)
CO2 SERPL-SCNC: 24 MMOL/L (ref 21–32)
CREAT BLD-MCNC: 0.91 MG/DL
EGFRCR SERPLBLD CKD-EPI 2021: 92 ML/MIN/1.73M2 (ref 60–?)
EOSINOPHIL # BLD AUTO: 0.14 X10(3) UL (ref 0–0.7)
EOSINOPHIL NFR BLD AUTO: 2.4 %
ERYTHROCYTE [DISTWIDTH] IN BLOOD BY AUTOMATED COUNT: 12.6 %
EST. AVERAGE GLUCOSE BLD GHB EST-MCNC: 108 MG/DL (ref 68–126)
FASTING PATIENT LIPID ANSWER: YES
FASTING STATUS PATIENT QL REPORTED: YES
GLOBULIN PLAS-MCNC: 3.3 G/DL (ref 2–3.5)
GLUCOSE BLD-MCNC: 80 MG/DL (ref 70–99)
HBA1C MFR BLD: 5.4 % (ref ?–5.7)
HCT VFR BLD AUTO: 38.4 %
HDLC SERPL-MCNC: 56 MG/DL (ref 40–59)
HGB BLD-MCNC: 12.4 G/DL
IMM GRANULOCYTES # BLD AUTO: 0.01 X10(3) UL (ref 0–1)
IMM GRANULOCYTES NFR BLD: 0.2 %
LDLC SERPL CALC-MCNC: 54 MG/DL (ref ?–100)
LYMPHOCYTES # BLD AUTO: 1.59 X10(3) UL (ref 1–4)
LYMPHOCYTES NFR BLD AUTO: 27.1 %
MCH RBC QN AUTO: 31.2 PG (ref 26–34)
MCHC RBC AUTO-ENTMCNC: 32.3 G/DL (ref 31–37)
MCV RBC AUTO: 96.7 FL
MONOCYTES # BLD AUTO: 0.48 X10(3) UL (ref 0.1–1)
MONOCYTES NFR BLD AUTO: 8.2 %
NEUTROPHILS # BLD AUTO: 3.61 X10 (3) UL (ref 1.5–7.7)
NEUTROPHILS # BLD AUTO: 3.61 X10(3) UL (ref 1.5–7.7)
NEUTROPHILS NFR BLD AUTO: 61.4 %
NONHDLC SERPL-MCNC: 67 MG/DL (ref ?–130)
OSMOLALITY SERPL CALC.SUM OF ELEC: 287 MOSM/KG (ref 275–295)
PLATELET # BLD AUTO: 174 10(3)UL (ref 150–450)
POTASSIUM SERPL-SCNC: 4.2 MMOL/L (ref 3.5–5.1)
PROT SERPL-MCNC: 7.7 G/DL (ref 5.7–8.2)
RBC # BLD AUTO: 3.97 X10(6)UL
SODIUM SERPL-SCNC: 139 MMOL/L (ref 136–145)
TRIGL SERPL-MCNC: 59 MG/DL (ref 30–149)
TSI SER-ACNC: 1.93 UIU/ML (ref 0.55–4.78)
VIT D+METAB SERPL-MCNC: 14.9 NG/ML (ref 30–100)
VLDLC SERPL CALC-MCNC: 9 MG/DL (ref 0–30)
WBC # BLD AUTO: 5.9 X10(3) UL (ref 4–11)

## 2024-12-09 PROCEDURE — 80053 COMPREHEN METABOLIC PANEL: CPT

## 2024-12-09 PROCEDURE — 36415 COLL VENOUS BLD VENIPUNCTURE: CPT

## 2024-12-09 PROCEDURE — 83036 HEMOGLOBIN GLYCOSYLATED A1C: CPT

## 2024-12-09 PROCEDURE — 80061 LIPID PANEL: CPT

## 2024-12-09 PROCEDURE — 84443 ASSAY THYROID STIM HORMONE: CPT

## 2024-12-09 PROCEDURE — 85025 COMPLETE CBC W/AUTO DIFF WBC: CPT

## 2024-12-09 PROCEDURE — 82306 VITAMIN D 25 HYDROXY: CPT

## 2024-12-10 NOTE — ED PROVIDER NOTES
Patient Seen in: THE [facility] [place]    History   Patient presents with:  Nausea/Vomiting/Diarrhea (gastrointestinal)    Stated Complaint: nausea/abd pain    HPI    Patient is a 70-year-old female, here with dad.   States she has been fee hearing loss, mouth sores, nosebleeds, postnasal drip, rhinorrhea, sinus pressure, sneezing, tinnitus, trouble swallowing and voice change. Eyes: Negative. Respiratory: Negative. Cardiovascular: Negative.     Gastrointestinal: Positive for nausea a has no rales. Abdominal: Soft. Bowel sounds are normal. She exhibits no distension and no mass. There is no tenderness. There is no rebound and no guarding. No CVA tenderness, no guarding or rebound, no masses palpated. No HSM. Normal bowel sounds.  No 10-Dec-2024 10:20

## 2024-12-23 ENCOUNTER — OFFICE VISIT (OUTPATIENT)
Dept: OBGYN CLINIC | Facility: CLINIC | Age: 19
End: 2024-12-23
Payer: COMMERCIAL

## 2024-12-23 VITALS
DIASTOLIC BLOOD PRESSURE: 83 MMHG | BODY MASS INDEX: 20.24 KG/M2 | SYSTOLIC BLOOD PRESSURE: 126 MMHG | WEIGHT: 110 LBS | HEIGHT: 62 IN

## 2024-12-23 DIAGNOSIS — Z12.4 ENCOUNTER FOR PAPANICOLAOU SMEAR FOR CERVICAL CANCER SCREENING: ICD-10-CM

## 2024-12-23 DIAGNOSIS — N63.15 BREAST LUMP ON RIGHT SIDE AT 9 O'CLOCK POSITION: ICD-10-CM

## 2024-12-23 DIAGNOSIS — Z01.419 ENCOUNTER FOR WELL WOMAN EXAM WITH ROUTINE GYNECOLOGICAL EXAM: Primary | ICD-10-CM

## 2024-12-23 DIAGNOSIS — N87.0 CIN I (CERVICAL INTRAEPITHELIAL NEOPLASIA I): ICD-10-CM

## 2024-12-23 DIAGNOSIS — R87.612 LGSIL ON PAP SMEAR OF CERVIX: ICD-10-CM

## 2024-12-23 DIAGNOSIS — N76.0 VAGINITIS AND VULVOVAGINITIS: ICD-10-CM

## 2024-12-23 RX ORDER — METRONIDAZOLE 7.5 MG/G
1 GEL VAGINAL NIGHTLY
COMMUNITY
Start: 2024-08-23

## 2024-12-23 NOTE — PROGRESS NOTES
[facility]  Obstetrics and Gynecology  Gynecology Visit    Chief Complaint   Patient presents with    Annual     Reviewed Preventative/Wellness form with patient.            Tere Burnett is a 19 year old female who presents for annual , bump in right breast requesting breast ultrasound   LMP: 12/16/24 .    Menses regular:  normal .    Menstrual flow normal: light .    Birth control or HRT:  kyleena  10/12/22 .   Refill none   Last Pap Smear: 06//15/22 .  Any history of abnormal paps:  11/27/20  Last MMG:   Any Medication Refills needed today?: none  Sleep: 7 hours .    Diet: normal .    Exercise: 2-3 times .   Screening labs/Blood work today: none .     Colonoscopy (if over 46 y/o): n/a .   Gardasil:(age 9-46 y/o) completed .   Genetic Cancer screen (if indicated): none.   Flu (Aug-April): not up to date .TDAP (every 10 years)  up to date .      Additional Problems/concerns: yeast symptoms .      Next Appt: would like to wait       Immunization History   Administered Date(s) Administered    DTAP 11/04/2003, 01/12/2004, 03/15/2004, 12/02/2004, 04/06/2009, 04/06/2009, 04/06/2009    Fluvirin, 3 Years & >, Im 01/18/2016    HEP A,Ped/Adol,(2 Dose) 04/06/2009, 06/21/2010    HEP B 10/04/2003, 01/12/2004, 12/02/2004    HEP B, Adult 12/02/2024    HIB 10/04/2003, 01/12/2004, 12/02/2004    HPV (Gardasil) 10/26/2015, 10/26/2015    Hpv Virus Vaccine 9 Mayte Im 07/30/2015, 10/26/2015, 10/16/2017    IPV 11/04/2003, 01/12/2004, 06/07/2004, 04/06/2009    Influenza 10/09/2010, 12/19/2012    MMR 09/23/2004, 12/18/2007    Meningococcal-Menactra 07/30/2015    Meningococcal-Menveo 2month-55yr 09/02/2021    Pneumococcal (Prevnar 7) 11/04/2003, 01/12/2004, 09/23/2004, 12/02/2004    TDAP 07/30/2015    Tb Intradermal Test 06/28/2005    Varicella 09/23/2004, 12/18/2007         Current Outpatient Medications:     fluconazole (DIFLUCAN) 150 MG Oral Tab, Take 1 tablet (150 mg total) by mouth daily., Disp: 3 tablet, Rfl: 0    albuterol 108  (90 Base) MCG/ACT Inhalation Aero Soln, Inhale 2 puffs into the lungs every 4 (four) hours as needed for Wheezing. PHYSICAL DUE, Disp: 1 each, Rfl: 0    pantoprazole 40 MG Oral Tab EC, Take one tablet (40 mg total) by mouth once daily, 30 minutes prior to breakfast., Disp: 90 tablet, Rfl: 0    levonorgestrel (KYLEENA) 19.5 MG Intrauterine IUD, 19,500 mcg (1 each total) by Intrauterine route one time., Disp: , Rfl:     linaCLOtide (LINZESS) 72 MCG Oral Cap, Take 72 mcg by mouth daily., Disp: 90 capsule, Rfl: 0    Allergies[1]    OB History    Para Term  AB Living   0 0 0 0 0 0   SAB IAB Ectopic Multiple Live Births   0 0 0 0 0       Past Medical History:    Abdominal distention    Abdominal pain    Abnormal uterine bleeding    Amenorrhea    Iud    Anxiety    Arthritis    Back pain    Bloating    Body piercing    Chronic rhinitis    Constipation    Decorative tattoo    Diarrhea, unspecified    Dyspareunia    Endometriosis    Esophageal reflux    Food intolerance    Frequent urination    Frequent UTI    Heart murmur    Resolved at 3 months    Heartburn    History of cardiac murmur    Indigestion    Irregular bowel habits    Kidney problem    currently under care of a urologist for Kidney reflux.     Leaking of urine    Loss of appetite    Menses painful    Nausea    Nausea and vomiting    Pain in joints    Pain with bowel movements    Painful urination    Presence of other cardiac implants and grafts    Iud    Stool incontinence    Stress    Uncomfortable fullness after meals    Visual impairment    glasses    Vomiting    Wears glasses    Weight loss    Wheezing    When exercising       Past Surgical History:   Procedure Laterality Date    Colonoscopy      Correct bunion,simple      Insert intrauterine device  10/12/2022    Kyleena    Other surgical history      Bilateral Bunionectomy    Other surgical history  10/26/2020    rbus Dr. Figueroa    Remove intrauterine device  10/12/2022    Shelly removed        Family History   Problem Relation Age of Onset    Heart Disorder Maternal Grandmother     Heart Disorder Maternal Grandfather     Heart Disease Maternal Grandfather     Heart Attack Maternal Grandfather     Cancer Paternal Grandmother     Stroke Neg                Social History     Socioeconomic History    Marital status: Single     Spouse name: Not on file    Number of children: Not on file    Years of education: Not on file    Highest education level: Not on file   Occupational History    Not on file   Tobacco Use    Smoking status: Never    Smokeless tobacco: Former   Vaping Use    Vaping status: Every Day   Substance and Sexual Activity    Alcohol use: Not Currently     Comment: Occasionally/ 2-3 seltzers when she does drink    Drug use: Yes     Frequency: 7.0 times per week     Types: Cannabis     Comment: everyday    Sexual activity: Yes   Other Topics Concern    Caffeine Concern No    Exercise Yes    Seat Belt Yes    Special Diet No    Stress Concern No    Weight Concern Yes     Comment: Only 95lbs     Service Not Asked    Blood Transfusions Not Asked    Occupational Exposure Not Asked    Hobby Hazards Not Asked    Sleep Concern Not Asked    Back Care Not Asked    Bike Helmet Not Asked    Self-Exams Not Asked   Social History Narrative    Not on file     Social Drivers of Health     Financial Resource Strain: Not on file   Food Insecurity: Not on file   Transportation Needs: Not on file   Physical Activity: Not on file   Stress: Not on file   Social Connections: Not on file   Housing Stability: At Risk (8/18/2023)    [facility] [employer]     Living Situation: Not on file     Housing Problems: Not on file         /83   Ht 5' 2\" (1.575 m)   Wt 110 lb (49.9 kg)   LMP 12/16/2024 (Approximate)   BMI 20.12 kg/m²     Wt Readings from Last 3 Encounters:   12/23/24 110 lb (49.9 kg)   12/02/24 110 lb 3.2 oz (50 kg)   11/25/24 109 lb 9.6 oz (49.7 kg)          Health Maintenance   Topic Date Due    Influenza Vaccine (1) 08/01/2021    Screen for Cervical Cancer 11/05/2021    DTaP,Tdap and Td Vaccines (3 - Td or Tdap) 03/18/2025    Hepatitis C Screening Completed    HIV Screening Completed    COVID-19 Vaccine Completed     Review of Systems   General: Present- Feeling well. Not Present- Chills, Fever, Weight Gain and Weight Loss.  HEENT: Not Present- Headache and Sore Throat.  Respiratory: Not Present- Cough, Difficulty Breathing, Hemoptysis and Sputum Production.  Cardiovascular: Not Present- Chest Pain, Elevated Blood Pressure, Fainting / Blacking Out and Shortness of Breath.  Gastrointestinal: Not Present- Constipation, Diarrhea, Nausea and Vomiting.  Female Genitourinary: Not Present- Discharge, Dysuria and Frequency.  Musculoskeletal: Not Present- Leg Cramps and Swelling of Extremities.  Neurological: Not Present- Dizziness and Headaches.  Psychiatric: Not Present- Anxiety and Depression.  Endocrine: Not Present- Appetite Changes, Hair Changes and Thyroid Problems.  Hematology: Not Present- Easy Bruising and Excessive bleeding.  All other systems negative     Physical Exam The physical exam findings are as follows:     General   Mental Status - Alert. General Appearance - Cooperative. Orientation - Oriented X4. Build & Nutrition - Well nourished.    Head and Neck  Thyroid   Gland Characteristics - normal size and consistency.    Chest and Lung Exam   Inspection:   Chest Wall: - Normal.  Percussion:   Quality and Intensity: - Percussion normal.  Palpation: - Palpation normal.  Auscultation:   Breath sounds: - Normal.  Adventitious sounds: - No Adventitious sounds.    Breast   Nipples: Characteristics - Bilateral - Normal. Discharge - Bilateral - None.  Breast - Bilateral - Symmetric. Bilateral fibrocystic changes - Right >left - bilateral piercing     Cardiovascular   Auscultation: Rhythm - Regular. Heart Sounds - Normal heart sounds.  Murmurs & Other Heart  Sounds: Auscultation of the heart reveals - No Murmurs.    Abdomen   Inspection: Inspection of the abdomen reveals - No Hernias. Incisional scars - no incisional scars.  Palpation/Percussion: Palpation and Percussion of the abdomen reveal - Non Tender and No Palpable abdominal masses.  Liver: - Normal.  Auscultation: Auscultation of the abdomen reveals - Bowel sounds normal.    Female Genitourinary     External Genitalia   Perineum - Normal. Bartholin's Gland - Bilateral - Normal. Clitoris - Normal.  Introitus: Characteristics - No Cystocele, Enterocele or Rectocele. Discharge - None.  Labia Majora: Lesions - Bilateral - None. Characteristics - Bilateral - Normal.  Labia Minora: Lesions - Bilateral - None. Characteristics - Bilateral - Normal.  Urethra: Characteristics - Normal. Discharge - None.  [place] Gland - Bilateral - Normal.  Vulva: Characteristics - Normal. Lesions - None.    Speculum & Bimanual   Vagina:   Vaginal Wall: - Normal.  Vaginal Lesions - None. Vaginal Mucosa - Normal.  Cervix: Characteristics - No Motion tenderness. Discharge - None. IUD at os   Uterus: Characteristics - Normal. Position - Midposition.  Adnexa: Characteristics - Bilateral - Normal. Masses - No Adnexal Masses.  Wet [place]: pH - 3.8-4.2. Vaginal discharge - Clear  and Thin. Amine Odor - Absent. Main patient complaints - Discharge.     Rectal   Anorectal Exam: External - normal external exam.    Peripheral Vascular   Upper Extremity:   Palpation: - Pulses bilaterally normal.  Lower Extremity: Inspection - Bilateral - Inspection Normal.  Palpation: Edema - Bilateral - No edema.    Neurologic   Mental Status: - Normal.    Lymphatic  General Lymphatics   Description - Normal .       1. Encounter for well woman exam with routine gynecological exam    2. Encounter for Papanicolaou smear for cervical cancer screening    3. LGSIL on Pap smear of cervix    4. PETTY I (cervical intraepithelial neoplasia I)                           [1]    Allergies  Allergen Reactions    Penicillins RASH     Rash (not hives) with PCN; has taken a cephalosporin with no problem    Bananas ITCHING     Tongue itching    Pineapple ITCHING     Itchy tongue    Strawberries ITCHING     Itchy tongue

## 2024-12-24 LAB
BV BACTERIA DNA VAG QL NAA+PROBE: NEGATIVE
C GLABRATA DNA VAG QL NAA+PROBE: NEGATIVE
C KRUSEI DNA VAG QL NAA+PROBE: NEGATIVE
CANDIDA DNA VAG QL NAA+PROBE: NEGATIVE
T VAGINALIS DNA VAG QL NAA+PROBE: NEGATIVE

## 2024-12-30 DIAGNOSIS — R11.2 NAUSEA AND VOMITING, UNSPECIFIED VOMITING TYPE: ICD-10-CM

## 2024-12-30 LAB — HPV E6+E7 MRNA CVX QL NAA+PROBE: NEGATIVE

## 2024-12-30 RX ORDER — ONDANSETRON 4 MG/1
4 TABLET, ORALLY DISINTEGRATING ORAL EVERY 4 HOURS PRN
Qty: 30 TABLET | Refills: 0 | Status: SHIPPED | OUTPATIENT
Start: 2024-12-30

## 2025-01-07 DIAGNOSIS — R11.2 NAUSEA AND VOMITING, UNSPECIFIED VOMITING TYPE: ICD-10-CM

## 2025-01-07 RX ORDER — PANTOPRAZOLE SODIUM 40 MG/1
TABLET, DELAYED RELEASE ORAL
Qty: 90 TABLET | Refills: 0 | Status: SHIPPED | OUTPATIENT
Start: 2025-01-07

## 2025-02-14 ENCOUNTER — HOSPITAL ENCOUNTER (OUTPATIENT)
Age: 20
Discharge: HOME | End: 2025-02-14
Payer: COMMERCIAL

## 2025-02-14 VITALS
WEIGHT: 110 LBS | HEART RATE: 74 BPM | SYSTOLIC BLOOD PRESSURE: 131 MMHG | DIASTOLIC BLOOD PRESSURE: 71 MMHG | BODY MASS INDEX: 20.24 KG/M2 | TEMPERATURE: 98 F | OXYGEN SATURATION: 100 % | RESPIRATION RATE: 16 BRPM | HEIGHT: 62 IN

## 2025-02-14 DIAGNOSIS — N30.00 ACUTE CYSTITIS WITHOUT HEMATURIA: Primary | ICD-10-CM

## 2025-02-14 LAB
B-HCG UR QL: NEGATIVE
BILIRUB UR QL STRIP: NEGATIVE
COLOR UR: YELLOW
GLUCOSE UR STRIP-MCNC: NEGATIVE MG/DL
KETONES UR STRIP-MCNC: NEGATIVE MG/DL
NITRITE UR QL STRIP: POSITIVE
PH UR STRIP: 6.5 [PH]
PROT UR STRIP-MCNC: NEGATIVE MG/DL
SP GR UR STRIP: 1.02
UROBILINOGEN UR STRIP-ACNC: <2 MG/DL

## 2025-02-14 PROCEDURE — 99214 OFFICE O/P EST MOD 30 MIN: CPT

## 2025-02-14 PROCEDURE — 87491 CHLMYD TRACH DNA AMP PROBE: CPT | Performed by: NURSE PRACTITIONER

## 2025-02-14 PROCEDURE — 87186 SC STD MICRODIL/AGAR DIL: CPT | Performed by: NURSE PRACTITIONER

## 2025-02-14 PROCEDURE — 81514 NFCT DS BV&VAGINITIS DNA ALG: CPT | Performed by: NURSE PRACTITIONER

## 2025-02-14 PROCEDURE — 81002 URINALYSIS NONAUTO W/O SCOPE: CPT

## 2025-02-14 PROCEDURE — 87088 URINE BACTERIA CULTURE: CPT | Performed by: NURSE PRACTITIONER

## 2025-02-14 PROCEDURE — 81025 URINE PREGNANCY TEST: CPT

## 2025-02-14 PROCEDURE — 87591 N.GONORRHOEAE DNA AMP PROB: CPT | Performed by: NURSE PRACTITIONER

## 2025-02-14 PROCEDURE — 87086 URINE CULTURE/COLONY COUNT: CPT | Performed by: NURSE PRACTITIONER

## 2025-02-14 RX ORDER — NITROFURANTOIN 25; 75 MG/1; MG/1
100 CAPSULE ORAL 2 TIMES DAILY
Qty: 10 CAPSULE | Refills: 0 | Status: SHIPPED | OUTPATIENT
Start: 2025-02-14 | End: 2025-02-19

## 2025-02-14 RX ORDER — FLUCONAZOLE 200 MG/1
200 TABLET ORAL
Qty: 2 TABLET | Refills: 0 | Status: SHIPPED | OUTPATIENT
Start: 2025-02-14 | End: 2025-02-18

## 2025-02-14 RX ORDER — METRONIDAZOLE 7.5 MG/G
1 GEL VAGINAL NIGHTLY
Qty: 70 G | Refills: 0 | Status: SHIPPED | OUTPATIENT
Start: 2025-02-14 | End: 2025-02-19

## 2025-02-15 LAB
BV BACTERIA DNA VAG QL NAA+PROBE: POSITIVE
C GLABRATA DNA VAG QL NAA+PROBE: NEGATIVE
C KRUSEI DNA VAG QL NAA+PROBE: NEGATIVE
CANDIDA DNA VAG QL NAA+PROBE: NEGATIVE
T VAGINALIS DNA VAG QL NAA+PROBE: NEGATIVE

## 2025-02-15 NOTE — ED PROVIDER NOTES
Patient Seen in: [facility] [place]      History     Chief Complaint   Patient presents with    Eval-G     Stated Complaint: Sexually Transmitted Infection Screen    Subjective:   19-year-old female presents to immediate care for vaginal irritation, dysuria and discharge.  Patient has a history of kidney reflux and chronic UTIs        Objective:     Past Medical History:    Abdominal distention    Abdominal pain    Abnormal uterine bleeding    Amenorrhea    Iud    Anxiety    Arthritis    Back pain    Bloating    Body piercing    Chronic rhinitis    Constipation    Decorative tattoo    Diarrhea, unspecified    Dyspareunia    Endometriosis    Esophageal reflux    Food intolerance    Frequent urination    Frequent UTI    Heart murmur    Resolved at 3 months    Heartburn    History of cardiac murmur    Indigestion    Irregular bowel habits    Kidney problem    currently under care of a urologist for Kidney reflux.     Leaking of urine    Loss of appetite    Menses painful    Nausea    Nausea and vomiting    Pain in joints    Pain with bowel movements    Painful urination    Presence of other cardiac implants and grafts    Iud    Stool incontinence    Stress    Uncomfortable fullness after meals    Visual impairment    glasses    Vomiting    Wears glasses    Weight loss    Wheezing    When exercising              Past Surgical History:   Procedure Laterality Date    Colonoscopy      Correct bunion,simple      Insert intrauterine device  10/12/2022    Kyleena    Other surgical history      Bilateral Bunionectomy    Other surgical history  10/26/2020    rbus Dr. Figueroa    Remove intrauterine device  10/12/2022    Shelly removed                Social History     Socioeconomic History    Marital status: Single   Tobacco Use    Smoking status: Never     Passive exposure: Never    Smokeless tobacco: Former   Vaping Use    Vaping status: Every Day   Substance and Sexual Activity    Alcohol use: Not Currently      Comment: Occasionally/ 2-3 seltzers when she does drink    Drug use: Yes     Frequency: 7.0 times per week     Types: Cannabis     Comment: everyday    Sexual activity: Yes   Other Topics Concern    Caffeine Concern No    Exercise Yes    Seat Belt Yes    Special Diet No    Stress Concern No    Weight Concern Yes     Comment: Only 95lbs     Social Drivers of Health      Received from [place] [place]    [facility]              Review of Systems   Constitutional: Negative.    Respiratory: Negative.     Cardiovascular: Negative.    Gastrointestinal: Negative.    Skin: Negative.    Neurological: Negative.        Positive for stated complaint: Sexually Transmitted Infection Screen  Other systems are as noted in HPI.  Constitutional and vital signs reviewed.      All other systems reviewed and negative except as noted above.    Physical Exam     ED Triage Vitals [02/14/25 1759]   /71   Pulse 74   Resp 16   Temp 98.3 °F (36.8 °C)   Temp src Oral   SpO2 100 %   O2 Device None (Room air)       Current Vitals:   Vital Signs  BP: 131/71  Pulse: 74  Resp: 16  Temp: 98.3 °F (36.8 °C)  Temp src: Oral    Oxygen Therapy  SpO2: 100 %  O2 Device: None (Room air)        Physical Exam  Vitals and nursing note reviewed.   Constitutional:       General: She is not in acute distress.  HENT:      Head: Normocephalic.   Cardiovascular:      Rate and Rhythm: Normal rate.   Pulmonary:      Effort: Pulmonary effort is normal.   Genitourinary:     General: Normal vulva.      Vagina: No vaginal discharge.   Musculoskeletal:         General: Normal range of motion.   Skin:     General: Skin is warm and dry.   Neurological:      General: No focal deficit present.      Mental Status: She is alert and oriented to person, place, and time.             ED Course     Labs Reviewed   [facility] POCT URINALYSIS DIPSTICK - Abnormal; Notable for the following components:       Result Value    Urine Clarity Slightly cloudy (*)     Blood, Urine  Trace-Intact (*)     Nitrite Urine Positive (*)     Leukocyte esterase urine Small (*)     All other components within normal limits   POCT PREGNANCY URINE - Normal   CHLAMYDIA/GONOCOCCUS, MARCUS   VAGINITIS VAGINOSIS PCR PANEL   URINE CULTURE, ROUTINE            MDM      Medical Decision Making  Pertinent Labs & Imaging studies reviewed. (See chart for details).  Patient coming in with vaginal discharge and odor.   Differential diagnosis includes UTI BV, yeast     Patient is comfortable with plan of care.     Overall Pt looks good. Non-toxic, well-hydrated and in no respiratory distress. Vital signs are reassuring. Exam is reassuring. I do not believe pt requires and additional diagnostic studies or intervention. I believe pt can be discharged home to continue evaluation as an outpatient. Follow-up provider given. Discharge instructions given and reviewed. Return for any problems. All understand and agrees with the plan.        Problems Addressed:  Acute cystitis without hematuria: acute illness or injury    Amount and/or Complexity of Data Reviewed  Labs: ordered. Decision-making details documented in ED Course.    Risk  OTC drugs.  Prescription drug management.        Disposition and Plan     Clinical Impression:  1. Acute cystitis without hematuria         Disposition:  Discharge  2/14/2025  6:46 pm    Follow-up:  Raman Borges MD  [address]  [facility] [place] [address]  [phone]                Medications Prescribed:  Discharge Medication List as of 2/14/2025  6:46 PM        START taking these medications    Details   nitrofurantoin monohydrate macro (MACROBID) 100 MG Oral Cap Take 1 capsule (100 mg total) by mouth 2 (two) times daily for 5 days., Normal, Disp-10 capsule, R-0      !! fluconazole 200 MG Oral Tab Take 1 tablet (200 mg total) by mouth every third day for 2 doses., Normal, Disp-2 tablet, R-0      !! metroNIDAZOLE 0.75 % Vaginal Gel Place 1 Applicatorful vaginally nightly for 5 days., Print,  Disp-70 g, R-0       !! - Potential duplicate medications found. Please discuss with provider.              Supplementary Documentation:

## 2025-02-17 ENCOUNTER — TELEPHONE (OUTPATIENT)
Dept: INTERNAL MEDICINE CLINIC | Facility: CLINIC | Age: 20
End: 2025-02-17

## 2025-02-17 LAB
C TRACH DNA SPEC QL NAA+PROBE: POSITIVE
N GONORRHOEA DNA SPEC QL NAA+PROBE: NEGATIVE

## 2025-02-17 RX ORDER — ERGOCALCIFEROL 1.25 MG/1
50000 CAPSULE, LIQUID FILLED ORAL WEEKLY
Qty: 4 CAPSULE | Refills: 0 | Status: SHIPPED | OUTPATIENT
Start: 2025-02-17 | End: 2025-02-17 | Stop reason: CLARIF

## 2025-02-17 RX ORDER — ERGOCALCIFEROL 1.25 MG/1
50000 CAPSULE, LIQUID FILLED ORAL WEEKLY
Qty: 24 CAPSULE | Refills: 0 | Status: SHIPPED | OUTPATIENT
Start: 2025-02-17 | End: 2025-08-04

## 2025-02-17 RX ORDER — DOXYCYCLINE 100 MG/1
100 CAPSULE ORAL 2 TIMES DAILY
Qty: 14 CAPSULE | Refills: 0 | Status: SHIPPED | OUTPATIENT
Start: 2025-02-17 | End: 2025-02-24

## 2025-02-17 NOTE — TELEPHONE ENCOUNTER
Pt is returning message for  about where to send Vitamin D. IT is to the [facility] in [place] on [address].

## 2025-02-17 NOTE — PROGRESS NOTES
Patient previously treated for positive urine culture, and BV. Routed to provider for review. Please return back to [facility] nurse pool.

## 2025-03-10 ENCOUNTER — OFFICE VISIT (OUTPATIENT)
Dept: OBGYN CLINIC | Facility: CLINIC | Age: 20
End: 2025-03-10
Payer: COMMERCIAL

## 2025-03-10 VITALS
DIASTOLIC BLOOD PRESSURE: 78 MMHG | SYSTOLIC BLOOD PRESSURE: 112 MMHG | HEIGHT: 62 IN | WEIGHT: 109.63 LBS | BODY MASS INDEX: 20.18 KG/M2 | HEART RATE: 69 BPM

## 2025-03-10 DIAGNOSIS — Z20.2 CHLAMYDIA CONTACT, TREATED: Primary | ICD-10-CM

## 2025-03-10 DIAGNOSIS — N76.0 VAGINITIS AND VULVOVAGINITIS: ICD-10-CM

## 2025-03-10 PROCEDURE — 87491 CHLMYD TRACH DNA AMP PROBE: CPT

## 2025-03-10 PROCEDURE — 81514 NFCT DS BV&VAGINITIS DNA ALG: CPT

## 2025-03-10 PROCEDURE — 87591 N.GONORRHOEAE DNA AMP PROB: CPT

## 2025-03-10 NOTE — PROGRESS NOTES
Tere Burnett is a 19 year old female  Patient's last menstrual period was 2024 (approximate).   Chief Complaint   Patient presents with    Follow - Up     Patient went to [facility] 25  -still having some discharge    .  25 went to  for vaginal irritation, dysuria and vaginal discharge.   Tested positive for bacterial vaginosis and chlamydia. Forgot to take doxy day 2 and day 3 of the regimen, but ended up completing the course.   She wanted to be re tested today to ensure the medications treated chlamydia and BV.     OBSTETRICS HISTORY:  OB History    Para Term  AB Living   0 0 0 0 0 0   SAB IAB Ectopic Multiple Live Births   0 0 0 0 0       GYNE HISTORY:      History   Sexual Activity    Sexual activity: Yes                 MEDICAL HISTORY:  Past Medical History:    Abdominal distention    Abdominal pain    Abnormal uterine bleeding    Amenorrhea    Iud    Anxiety    Arthritis    Back pain    Bloating    Body piercing    Chronic rhinitis    Constipation    Decorative tattoo    Diarrhea, unspecified    Dyspareunia    Endometriosis    Esophageal reflux    Food intolerance    Frequent urination    Frequent UTI    Heart murmur    Resolved at 3 months    Heartburn    History of cardiac murmur    Indigestion    Irregular bowel habits    Kidney problem    currently under care of a urologist for Kidney reflux.     Leaking of urine    Loss of appetite    Menses painful    Nausea    Nausea and vomiting    Pain in joints    Pain with bowel movements    Painful urination    Presence of other cardiac implants and grafts    Iud    Stool incontinence    Stress    Uncomfortable fullness after meals    Visual impairment    glasses    Vomiting    Wears glasses    Weight loss    Wheezing    When exercising       SURGICAL HISTORY:  Past Surgical History:   Procedure Laterality Date    Colonoscopy      Correct bunion,simple      Insert intrauterine device  10/12/2022    Kyleena    Other surgical history       Bilateral Bunionectomy    Other surgical history  10/26/2020    rbus Dr. Figueroa    Remove intrauterine device  10/12/2022    Shelly removed       SOCIAL HISTORY:  Social History     Socioeconomic History    Marital status: Single     Spouse name: Not on file    Number of children: Not on file    Years of education: Not on file    Highest education level: Not on file   Occupational History    Not on file   Tobacco Use    Smoking status: Never     Passive exposure: Never    Smokeless tobacco: Former   Vaping Use    Vaping status: Every Day   Substance and Sexual Activity    Alcohol use: Not Currently     Comment: Occasionally/ 2-3 seltzers when she does drink    Drug use: Yes     Frequency: 7.0 times per week     Types: Cannabis     Comment: everyday    Sexual activity: Yes   Other Topics Concern    Caffeine Concern No    Exercise Yes    Seat Belt Yes    Special Diet No    Stress Concern No    Weight Concern Yes     Comment: Only 95lbs     Service Not Asked    Blood Transfusions Not Asked    Occupational Exposure Not Asked    Hobby Hazards Not Asked    Sleep Concern Not Asked    Back Care Not Asked    Bike Helmet Not Asked    Self-Exams Not Asked   Social History Narrative    Not on file     Social Drivers of Health     Food Insecurity: Not on file   Transportation Needs: Not on file   Stress: Not on file   Housing Stability: At Risk (8/18/2023)    [facility] from [employer], [facility], [facility]     Living Situation: Not on file     Housing Problems: Not on file       FAMILY HISTORY:  Family History   Problem Relation Age of Onset    Heart Disorder Maternal Grandmother     Heart Disorder Maternal Grandfather     Heart Disease Maternal Grandfather     Heart Attack Maternal Grandfather     Cancer Paternal Grandmother     Stroke Neg        MEDICATIONS:    Current Outpatient Medications:     ergocalciferol 1.25 MG (32120 UT) Oral Cap, Take 1 capsule (50,000 Units total) by mouth once a week., Disp: 24  capsule, Rfl: 0    pantoprazole 40 MG Oral Tab EC, Take one tablet (40 mg total) by mouth once daily, 30 minutes prior to breakfast., Disp: 90 tablet, Rfl: 0    ONDANSETRON 4 MG Oral Tablet Dispersible, DISSOLVE 1 TABLET(4 MG) ON THE TONGUE EVERY 4 HOURS AS NEEDED, Disp: 30 tablet, Rfl: 0    Albuterol-Budesonide 90-80 MCG/ACT Inhalation Aerosol, , Disp: , Rfl:     albuterol 108 (90 Base) MCG/ACT Inhalation Aero Soln, Inhale 2 puffs into the lungs every 4 (four) hours as needed for Wheezing. PHYSICAL DUE, Disp: 1 each, Rfl: 0    levonorgestrel (KYLEENA) 19.5 MG Intrauterine IUD, 19,500 mcg (1 each total) by Intrauterine route one time., Disp: , Rfl:     linaCLOtide (LINZESS) 72 MCG Oral Cap, Take 72 mcg by mouth daily., Disp: 90 capsule, Rfl: 0    ALLERGIES:  Allergies[1]      PHYSICAL EXAM:   Pelvic Exam:  External Genitalia: normal appearance, hair distribution, and no lesions  Urethral Meatus:  normal in size, location, without lesions and prolapse  Bladder:  No fullness, masses or tenderness  Vagina:  Normal appearance without lesions, no abnormal discharge  Cervix:  Normal without tenderness on motion  Perineum: normal  Anus: no hemorroids     Assessment & Plan:    1. Chlamydia contact, treated    - Chlamydia/Gc Amplification; Future    2. Vaginitis and vulvovaginitis    - Vaginitis Vaginosis PCR Panel; Future                   [1]   Allergies  Allergen Reactions    Penicillins RASH     Rash (not hives) with PCN; has taken a cephalosporin with no problem    Bananas ITCHING     Tongue itching    Pineapple ITCHING     Itchy tongue    Strawberries ITCHING     Itchy tongue

## 2025-03-11 LAB
BV BACTERIA DNA VAG QL NAA+PROBE: NEGATIVE
C GLABRATA DNA VAG QL NAA+PROBE: NEGATIVE
C KRUSEI DNA VAG QL NAA+PROBE: NEGATIVE
C TRACH DNA SPEC QL NAA+PROBE: NEGATIVE
CANDIDA DNA VAG QL NAA+PROBE: NEGATIVE
N GONORRHOEA DNA SPEC QL NAA+PROBE: NEGATIVE
T VAGINALIS DNA VAG QL NAA+PROBE: NEGATIVE

## 2025-03-20 ENCOUNTER — HOSPITAL ENCOUNTER (OUTPATIENT)
Age: 20
Discharge: HOME | End: 2025-03-20
Attending: EMERGENCY MEDICINE
Payer: COMMERCIAL

## 2025-03-20 VITALS
DIASTOLIC BLOOD PRESSURE: 59 MMHG | OXYGEN SATURATION: 96 % | TEMPERATURE: 99 F | SYSTOLIC BLOOD PRESSURE: 103 MMHG | RESPIRATION RATE: 18 BRPM | HEART RATE: 99 BPM

## 2025-03-20 DIAGNOSIS — J06.9 VIRAL URI: Primary | ICD-10-CM

## 2025-03-20 DIAGNOSIS — R11.10 VOMITING, UNSPECIFIED VOMITING TYPE, UNSPECIFIED WHETHER NAUSEA PRESENT: ICD-10-CM

## 2025-03-20 LAB
POCT INFLUENZA A: NEGATIVE
POCT INFLUENZA B: NEGATIVE
SARS-COV-2 RNA RESP QL NAA+PROBE: NOT DETECTED

## 2025-03-20 PROCEDURE — 87502 INFLUENZA DNA AMP PROBE: CPT | Performed by: EMERGENCY MEDICINE

## 2025-03-20 PROCEDURE — 99213 OFFICE O/P EST LOW 20 MIN: CPT

## 2025-03-20 PROCEDURE — S0119 ONDANSETRON 4 MG: HCPCS

## 2025-03-20 RX ORDER — ONDANSETRON 4 MG/1
4 TABLET, ORALLY DISINTEGRATING ORAL EVERY 4 HOURS PRN
Qty: 10 TABLET | Refills: 0 | Status: SHIPPED | OUTPATIENT
Start: 2025-03-20 | End: 2025-03-27

## 2025-03-20 RX ORDER — ONDANSETRON 4 MG/1
4 TABLET, ORALLY DISINTEGRATING ORAL ONCE
Status: COMPLETED | OUTPATIENT
Start: 2025-03-20 | End: 2025-03-20

## 2025-03-20 NOTE — ED PROVIDER NOTES
Patient Seen in: [facility]      History     Chief Complaint   Patient presents with    Cold     Entered by patient     Stated Complaint: Cold    Subjective:   HPI      19-year-old female with a 2-day history of nasal congestion, nonproductive cough and tactile fevers states that she had several episode of vomiting overnight last night.  No diarrhea.  No abdominal pain.    Objective:     Past Medical History:    Abdominal distention    Abdominal pain    Abnormal uterine bleeding    Amenorrhea    Iud    Anxiety    Arthritis    Back pain    Bloating    Body piercing    Chronic rhinitis    Constipation    Decorative tattoo    Diarrhea, unspecified    Dyspareunia    Endometriosis    Esophageal reflux    Food intolerance    Frequent urination    Frequent UTI    Heart murmur    Resolved at 3 months    Heartburn    History of cardiac murmur    Indigestion    Irregular bowel habits    Kidney problem    currently under care of a urologist for Kidney reflux.     Leaking of urine    Loss of appetite    Menses painful    Nausea    Nausea and vomiting    Pain in joints    Pain with bowel movements    Painful urination    Presence of other cardiac implants and grafts    Iud    Stool incontinence    Stress    Uncomfortable fullness after meals    Visual impairment    glasses    Vomiting    Wears glasses    Weight loss    Wheezing    When exercising              Past Surgical History:   Procedure Laterality Date    Colonoscopy      Correct bunion,simple      Insert intrauterine device  10/12/2022    Kyleena    Other surgical history      Bilateral Bunionectomy    Other surgical history  10/26/2020    rbus Dr. Figueroa    Remove intrauterine device  10/12/2022    Shelly removed                Social History     Socioeconomic History    Marital status: Single   Tobacco Use    Smoking status: Never     Passive exposure: Never    Smokeless tobacco: Former   Vaping Use    Vaping status: Every Day   Substance and Sexual  Activity    Alcohol use: Not Currently     Comment: Occasionally/ 2-3 seltzers when she does drink    Drug use: Yes     Frequency: 7.0 times per week     Types: Cannabis     Comment: everyday    Sexual activity: Yes   Other Topics Concern    Caffeine Concern No    Exercise Yes    Seat Belt Yes    Special Diet No    Stress Concern No    Weight Concern Yes     Comment: Only 95lbs     Social Drivers of Health      Received from [employer], [employer]    [facility]              Review of Systems    Positive for stated complaint: Cold  Other systems are as noted in HPI.  Constitutional and vital signs reviewed.      All other systems reviewed and negative except as noted above.    Physical Exam     ED Triage Vitals [03/20/25 1329]   /59   Pulse 99   Resp 18   Temp 99.1 °F (37.3 °C)   Temp src Oral   SpO2 96 %   O2 Device None (Room air)       Current Vitals:   Vital Signs  BP: 103/59  Pulse: 99  Resp: 18  Temp: 99.1 °F (37.3 °C)  Temp src: Oral    Oxygen Therapy  SpO2: 96 %  O2 Device: None (Room air)        Physical Exam     General Appearance: This is a young adult female sitting on a gurney.  Vital signs were reviewed per nurses notes.  Patient is afebrile.  Pulse oximetry is 96% on room air.  HEENT: Normocephalic/atraumatic.  Anicteric sclera.  Oral mucosa is moist.  Oropharynx is normal.  Neck: No adenopathy or thyromegaly.  Lungs are clear to auscultation.  Heart exam: Normal S1-S2 without extra sounds or murmurs.  Regular rate and rhythm.  Skin is dry without rashes or lesions.  Neuroexam: Awake, conversive and moving all 4 extremities well.  ED Course     Labs Reviewed   POCT FLU TEST - Normal    Narrative:     This assay is a rapid molecular in vitro test utilizing nucleic acid amplification of influenza A and B viral RNA.   RAPID SARS-COV-2 BY PCR - Normal            COVID and flu are both negative.  The patient was treated with Zofran and ODT and was able to tolerate oral fluids.    Test results and  treatment plan were discussed prior to disposition.       MDM      #1.  Viral URI.  2.  Vomiting likely secondary to #1.  Not COVID or flu.  Able to tolerate oral fluids after Zofran.  Antiemetic prescribed.  Dietary restrictions discussed.        Medical Decision Making      Disposition and Plan     Clinical Impression:  1. Viral URI    2. Vomiting, unspecified vomiting type, unspecified whether nausea present         Disposition:  Discharge  3/20/2025  2:32 pm    Follow-up:  Raman Borges MD  [address] [address]  [phone]    Call   As needed          Medications Prescribed:  Discharge Medication List as of 3/20/2025  2:33 PM        START taking these medications    Details   !! ondansetron 4 MG Oral Tablet Dispersible Take 1 tablet (4 mg total) by mouth every 4 (four) hours as needed for Nausea., Normal, Disp-10 tablet, R-0       !! - Potential duplicate medications found. Please discuss with provider.              Supplementary Documentation:

## 2025-04-28 ENCOUNTER — OFFICE VISIT (OUTPATIENT)
Dept: OBGYN CLINIC | Facility: CLINIC | Age: 20
End: 2025-04-28
Payer: COMMERCIAL

## 2025-04-28 VITALS
HEIGHT: 62 IN | WEIGHT: 108 LBS | DIASTOLIC BLOOD PRESSURE: 72 MMHG | BODY MASS INDEX: 19.88 KG/M2 | HEART RATE: 75 BPM | SYSTOLIC BLOOD PRESSURE: 110 MMHG

## 2025-04-28 DIAGNOSIS — Z11.3 SCREENING EXAMINATION FOR STI: ICD-10-CM

## 2025-04-28 DIAGNOSIS — N76.0 VAGINITIS AND VULVOVAGINITIS: Primary | ICD-10-CM

## 2025-04-28 PROCEDURE — 81514 NFCT DS BV&VAGINITIS DNA ALG: CPT

## 2025-04-28 PROCEDURE — 87591 N.GONORRHOEAE DNA AMP PROB: CPT

## 2025-04-28 PROCEDURE — 87491 CHLMYD TRACH DNA AMP PROBE: CPT

## 2025-04-28 PROCEDURE — 99459 PELVIC EXAMINATION: CPT

## 2025-04-28 PROCEDURE — 3008F BODY MASS INDEX DOCD: CPT

## 2025-04-28 PROCEDURE — 99213 OFFICE O/P EST LOW 20 MIN: CPT

## 2025-04-28 PROCEDURE — 3074F SYST BP LT 130 MM HG: CPT

## 2025-04-28 PROCEDURE — 3078F DIAST BP <80 MM HG: CPT

## 2025-04-28 NOTE — PROGRESS NOTES
Tere Burnett is a 19 year old female  Patient's last menstrual period was 2024 (approximate).   Chief Complaint   Patient presents with    Gyn Problem     -c/o brown discharge for about one week and cramping    Other     Patient said YES to student    .  She has the Mirena IUD, has never had brown spotting before, wanted to make sure everything is ok. She is requesting vaginitis and g/c testing     OBSTETRICS HISTORY:  OB History    Para Term  AB Living   0 0 0 0 0 0   SAB IAB Ectopic Multiple Live Births   0 0 0 0 0       GYNE HISTORY:      History   Sexual Activity    Sexual activity: Yes                 MEDICAL HISTORY:  Past Medical History[1]    SURGICAL HISTORY:  Past Surgical History[2]    SOCIAL HISTORY:  Social History     Socioeconomic History    Marital status: Single     Spouse name: Not on file    Number of children: Not on file    Years of education: Not on file    Highest education level: Not on file   Occupational History    Not on file   Tobacco Use    Smoking status: Never     Passive exposure: Never    Smokeless tobacco: Former   Vaping Use    Vaping status: Every Day   Substance and Sexual Activity    Alcohol use: Not Currently     Comment: Occasionally/ 2-3 seltzers when she does drink    Drug use: Yes     Frequency: 7.0 times per week     Types: Cannabis     Comment: everyday    Sexual activity: Yes   Other Topics Concern    Caffeine Concern No    Exercise Yes    Seat Belt Yes    Special Diet No    Stress Concern No    Weight Concern Yes     Comment: Only 95lbs     Service Not Asked    Blood Transfusions Not Asked    Occupational Exposure Not Asked    Hobby Hazards Not Asked    Sleep Concern Not Asked    Back Care Not Asked    Bike Helmet Not Asked    Self-Exams Not Asked   Social History Narrative    Not on file     Social Drivers of Health     Food Insecurity: Not on file   Transportation Needs: Not on file   Stress: Not on file   Housing Stability: At Risk  (8/18/2023)    Received from [facility]     Living Situation: Not on file     Housing Problems: Not on file       FAMILY HISTORY:  Family History[3]    MEDICATIONS:  Medications - Current[4]    ALLERGIES:  Allergies[5]      PHYSICAL EXAM:   Pelvic Exam:  External Genitalia: normal appearance, hair distribution, and no lesions  Urethral Meatus:  normal in size, location, without lesions and prolapse  Bladder:  No fullness, masses or tenderness  Vagina:  Normal appearance without lesions, no abnormal discharge  Cervix:  Normal without tenderness on motion, brown strings seen in the cervix. Able to palpate to the IUD.  Uterus: normal in size, contour, position, mobility, without tenderness  Adnexa: normal without masses or tenderness  Perineum: normal  Anus: no hemorroids     Assessment & Plan:    1. Vaginitis and vulvovaginitis    - Vaginitis Vaginosis PCR Panel; Future    2. Screening examination for STI    - Chlamydia/Gc Amplification; Future                 [1]   Past Medical History:   Abdominal distention    Abdominal pain    Abnormal uterine bleeding    Amenorrhea    Iud    Anxiety    Arthritis    Back pain    Bloating    Body piercing    Chronic rhinitis    Constipation    Decorative tattoo    Diarrhea, unspecified    Dyspareunia    Endometriosis    Esophageal reflux    Food intolerance    Frequent urination    Frequent UTI    Heart murmur    Resolved at 3 months    Heartburn    History of cardiac murmur    Indigestion    Irregular bowel habits    Kidney problem    currently under care of a urologist for Kidney reflux.     Leaking of urine    Loss of appetite    Menses painful    Nausea    Nausea and vomiting    Pain in joints    Pain with bowel movements    Painful urination    Presence of other cardiac implants and grafts    Iud    Stool incontinence    Stress    Uncomfortable fullness after meals    Visual impairment    glasses    Vomiting    Wears glasses    Weight loss    Wheezing    When  exercising   [2]   Past Surgical History:  Procedure Laterality Date    Colonoscopy      Correct bunion,simple      Insert intrauterine device  10/12/2022    Kyleena    Other surgical history      Bilateral Bunionectomy    Other surgical history  10/26/2020    rbus Dr. Figueroa    Remove intrauterine device  10/12/2022    Shelly removed   [3]   Family History  Problem Relation Age of Onset    Heart Disorder Maternal Grandmother     Heart Disorder Maternal Grandfather     Heart Disease Maternal Grandfather     Heart Attack Maternal Grandfather     Cancer Paternal Grandmother     Stroke Neg    [4]   Current Outpatient Medications:     ergocalciferol 1.25 MG (96267 UT) Oral Cap, Take 1 capsule (50,000 Units total) by mouth once a week., Disp: 24 capsule, Rfl: 0    pantoprazole 40 MG Oral Tab EC, Take one tablet (40 mg total) by mouth once daily, 30 minutes prior to breakfast., Disp: 90 tablet, Rfl: 0    ONDANSETRON 4 MG Oral Tablet Dispersible, DISSOLVE 1 TABLET(4 MG) ON THE TONGUE EVERY 4 HOURS AS NEEDED, Disp: 30 tablet, Rfl: 0    Albuterol-Budesonide 90-80 MCG/ACT Inhalation Aerosol, , Disp: , Rfl:     albuterol 108 (90 Base) MCG/ACT Inhalation Aero Soln, Inhale 2 puffs into the lungs every 4 (four) hours as needed for Wheezing. PHYSICAL DUE, Disp: 1 each, Rfl: 0    levonorgestrel (KYLEENA) 19.5 MG Intrauterine IUD, 19,500 mcg (1 each total) by Intrauterine route one time., Disp: , Rfl:     linaCLOtide (LINZESS) 72 MCG Oral Cap, Take 72 mcg by mouth daily., Disp: 90 capsule, Rfl: 0  [5]   Allergies  Allergen Reactions    Penicillins RASH     Rash (not hives) with PCN; has taken a cephalosporin with no problem    Bananas ITCHING     Tongue itching    Pineapple ITCHING     Itchy tongue    Strawberries ITCHING     Itchy tongue

## 2025-05-03 DIAGNOSIS — R11.2 NAUSEA AND VOMITING, UNSPECIFIED VOMITING TYPE: ICD-10-CM

## 2025-05-03 RX ORDER — PANTOPRAZOLE SODIUM 40 MG/1
40 TABLET, DELAYED RELEASE ORAL
Qty: 90 TABLET | Refills: 0 | Status: SHIPPED | OUTPATIENT
Start: 2025-05-03

## 2025-05-05 ENCOUNTER — TELEPHONE (OUTPATIENT)
Dept: INTERNAL MEDICINE CLINIC | Facility: CLINIC | Age: 20
End: 2025-05-05

## (undated) DIAGNOSIS — G43.009 MIGRAINE WITHOUT AURA AND WITHOUT STATUS MIGRAINOSUS, NOT INTRACTABLE: ICD-10-CM

## (undated) DEVICE — KENDALL SCD EXPRESS SLEEVES, KNEE LENGTH, MEDIUM: Brand: KENDALL SCD

## (undated) DEVICE — ZIPWIRE GUIDEWIRE .038X150 STR

## (undated) DEVICE — CYSTO CDS-LF: Brand: MEDLINE INDUSTRIES, INC.

## (undated) DEVICE — SYRINGE 30ML LL TIP

## (undated) DEVICE — STERILE POLYISOPRENE POWDER-FREE SURGICAL GLOVES: Brand: PROTEXIS

## (undated) DEVICE — Device

## (undated) DEVICE — TIGERTAIL 5F FLXTIP 70CM

## (undated) DEVICE — SOL  .9 3000ML

## (undated) NOTE — LETTER
[facility] IN [place]  [address] [address]  Dept: [phone]  Dept Fax: [phone]      May 9, 2017    Patient: Maretta Bosworth   Date of Visit: 5/9/2017       To Whom It May Concern:    Maretta Bosworth was seen and treated in

## (undated) NOTE — MR AVS SNAPSHOT
After Visit Summary   11/27/2020    Macy Hung    MRN: [id number]           Visit Information     Date & Time  11/27/2020 11:30 AM Provider  Renee Turner MD Department  [facility] [address], [facility] [place] Dept.  Phone  [phone]      Your V [facility] now offers Video Visits through [address] for adult and pediatric patients. Video Visits are available Monday - Friday for many common conditions such as allergies, colds, cough, fever, rash, sore throat, headache and pink eye.   The cost for a Video Vi Directly   Monday – Friday  4:00 pm – 10:00 pm   Saturday – Sunday  10:00 am – 4:00 pm  WALK-IN CARE  Emergency Medicine Providers  Conditions needing urgent attention, but are   non-life-threatening.     Also available by appointment Average cost  $120*

## (undated) NOTE — LETTER
Date & Time: 2/6/2019, 4:38 PM  Patient: Juany Yoo  Encounter Provider(s):    Dian Goins       To Whom It May Concern:    Juany Yoo was seen and treated in our department on 2/6/2019.   PT may return to school tomorrow if feeling better and

## (undated) NOTE — LETTER
Jeovany Parrish, SBQ:[birth year]    CONSENT FOR PROCEDURE/SEDATION    1. I authorize the performance upon Jeovany Parrish  the following: Insertion Intrauterine Device  Shelly    2.  I authorize Dr. David Koehler MD (and whomever is designated as the doctor’s assistant Witness: _________________________________________ Date:___________     Physician Signature: _______________________________ Date:___________

## (undated) NOTE — LETTER
Date: 10/16/2019    Patient Name: Mitzi Willoughby          To Whom it may concern: This letter has been written at the patient's request. The above patient was seen at the [facility] for treatment of a medical condition.     This patient should

## (undated) NOTE — LETTER
Date: 12/16/2019    Patient Name: Maretta Bosworth          To Whom it may concern: The above patient was seen at the [facility] for treatment of a medical condition- Thoracic Back Pain.       The patient may return to school on 12/17/19 with t

## (undated) NOTE — LETTER
10/21/19    Re: Inder Glez  : 2005    To Whom It May Concern:  Inder Glez is under my care for gynecological issue. Please excuse her from school/work today.   If you have any questions concerning this letter, please feel free to contact my offi

## (undated) NOTE — LETTER
Date & Time: 8/12/2022, 1:14 PM  Patient: Randolph Person  Encounter Provider(s):    RODRIGO Garrett       To Whom It May Concern:    Alan Coates was seen and treated in our department on 8/12/2022. She can return to work 08/13/2022 unless continuing to have significant pain. If so please allow her to return on  Monday 08/15/2022.     If you have any questions or concerns, please do not hesitate to call.        _____________________________  Physician/APC Signature

## (undated) NOTE — LETTER
Date & Time: 6/3/2020, 1:35 PM  Patient: Naveed Schultz  Encounter Provider(s):    Annette Lizarraga MD       To Whom It May Concern:    Solitario Alvarez was seen and treated in our department on 6/3/2020.  She should not return to work until June 4..    If y

## (undated) NOTE — ED AVS SNAPSHOT
Parent/Legal Guardian Access to the Online [facility] Record of a Patient 15to 16Years Old  Return completed form by Secure email to [facility] HIM/Medical Records Department: [email or web address].     Requirements and Procedures   Under [facility] MyChart ID and password with another person, that person may be able to view my or my child’s health information, and health information about someone who has authorized me as a MyChart proxy.    ·  I agree that it is my responsibility to select a confident Sign-Up Form and I agree to its terms.        Authorization Form     Please enter Patient’s information below:   Name (last, first, middle initial) __________________________________________   Gender  Male  Female    Last 4 Digits of Social Security Number Parent/Legal Guardian Signature                                  For Patient (1317 years of age)  I agree to allow my parent/legal guardian, named above, online access to my medical information currently available and that may become available as a result

## (undated) NOTE — LETTER
Date & Time: 6/3/2020, 1:33 PM  Patient: Julieta Reid  Encounter Provider(s):    Kimberly Sifuentes MD       To Whom It May Concern:    Kassandra Hugo was seen and treated in our department on 6/3/2020. She should not return to school until June 4th. Flores Avila

## (undated) NOTE — LETTER
Date & Time: 5/14/2018, 3:24 PM  Patient: Stewart Gonzalez  Encounter Provider(s):    Dian Elam       To Whom It May Concern:    Stewart Gonzalez was seen and treated in our department on 5/14/2018.  She returned to school on Wednesday or Tuesday feeling

## (undated) NOTE — MR AVS SNAPSHOT
[facility]  [address] [id number] [phone]               Thank you for choosing us for your health care visit with MIRYAM Gregg. We are glad to serve you and happy to provide you with this summary of your visit.   Please h allergic reaction can cause swelling of the tube between your ear and throat (the eustachian tube). The swelling may trap bacteria in your middle ear, resulting in a bacterial infection.    Pressure from the buildup of pus or fluid in the ear sometimes caus normal when your provider examines you again, you may need to take a different antibiotic or other medicine. In this case, it may take another 1 to 2 weeks before your ear feels normal again. What can I do to take care of myself?    Follow your healthcare How can I prevent an ear infection from occurring? If you tend to get ear infections often, ask your healthcare provider if you need to be checked for allergies. Getting treatment for allergies may help prevent ear infections.    Ask if using decongestant visit, view other health information and more. To sign up or find more information on getting   Proxy Access to your child’s MyChart go to [email or web address] and click on the   Sign Up Forms link in the Additional Information box on the right. o Eating low-fat dairy products like yogurt, milk, and cheese  o Regularly eating meals together as a family  o Limiting fast food, take out food, and eating out at restaurants  o Preparing foods at home as a family  o Eating a diet rich in calcium  o [address] [place] [address]

## (undated) NOTE — LETTER
Date: 7/27/2023    Patient Name: Tristan Jaramillo          To Whom it may concern: This letter has been written at the patient's request. The above patient was seen at the [facility] for treatment of a medical condition. This patient should be excused from attending school from 7/19/23 through 8/6/23. The patient may return to school on 8/7/23 with the following limitations none.         Sincerely,          Raquel Abdalla, APRN

## (undated) NOTE — LETTER
19    Re: Silverio Chavez  : 2005    To Whom It May Concern:  Silverio Chavez was seen today 2019 at the [facility] obstetrics and gynecology office with Dr. Dylon Greco.    If you have any questions concerning this letter, please feel free to contact my

## (undated) NOTE — LETTER
Date & Time: 1/31/2023, 2:14 PM  Patient: Joann Rodriguez  Encounter Provider(s):    RODRIGO Mejia       To Whom It May Concern:    Bo Luciano was seen and treated in our department on 1/31/2023. She should not return to work until 2/3/23. If you have any questions or concerns, please do not hesitate to call.         _[place] BARAJASP-C

## (undated) NOTE — LETTER
Date & Time: 11/21/2019, 10:00 PM  Patient: Jero Bolanos  Encounter Provider(s):    MIRYAM Moeller       To Whom It May Concern:    Jero Bolanos was seen and treated in our department on 11/21/2019.  She can return to school on 11/25/19 and work on

## (undated) NOTE — LETTER
Date & Time: 11/27/2018, 6:49 PM  Patient: Blanco Rhodes  Encounter Provider(s):    JUANCHO Duarte       To Whom It May Concern:    Blanco Rhodes was seen and treated in our department on 11/27/2018. Patient may return to gym or sports on Thursday.   P

## (undated) NOTE — ED AVS SNAPSHOT
Parent/Legal Guardian Access to the Online [employer] Record of a Patient 15to 16Years Old  Return completed form by Secure email to [facility] HIM/Medical Records Department: [email or web address].     Requirements and Procedures   Under [facility] MyChart ID and password with another person, that person may be able to view my or my child’s health information, and health information about someone who has authorized me as a MyChart proxy.    ·  I agree that it is my responsibility to select a confident Sign-Up Form and I agree to its terms.        Authorization Form     Please enter Patient’s information below:   Name (last, first, middle initial) __________________________________________   Gender  Male  Female    Last 4 Digits of Social Security Number Parent/Legal Guardian Signature                                  For Patient (1317 years of age)  I agree to allow my parent/legal guardian, named above, online access to my medical information currently available and that may become available as a result

## (undated) NOTE — LETTER
Date: 3/13/2018    Patient Name: Mitzi Willoughby          To Whom it may concern: The above patient was seen at the [facility] for treatment of a medical condition. This patient should be excused from attending school through 3/13/18.     Gisell Armenta

## (undated) NOTE — LETTER
Date: 9/4/2018    Patient Name: Zoe Bennett          To Whom it may concern: This letter has been written at the patient's request. The above patient was seen at the [facility] for treatment of a medical condition.     This patient should b

## (undated) NOTE — LETTER
Date: 12/20/2019    Patient Name: Bonnie Dias          To Whom it may concern: This letter has been written at the patient's request. The above patient was seen at the [facility] for treatment of a medical condition.     The patient may ret

## (undated) NOTE — LETTER
Date: 4/18/2018    Patient Name: Melany Lynn          To Whom it may concern: This letter has been written at the patient's request. The above patient was seen at the [facility] for treatment of a medical condition.     The patient may retu

## (undated) NOTE — LETTER
Date & Time: 9/25/2023, 3:47 PM  Patient: Lavelle Latif  Encounter Provider(s):    Katina Pabon PA-C       To Whom It May Concern:    Kyle Mata was seen and treated in our department on 9/25/2023. She should not return to school until 9/26/23 . If you have any questions or concerns, please do not hesitate to call.       Mona Gomez PA-C    _____________________________  [id number] Signature

## (undated) NOTE — LETTER
19    Re: Jeovany Parrish  : 2005    To Whom It May Concern:  Jeovany Parrish is under my care . Please excuse her from school/work . If you have any questions concerning this letter, please feel free to contact my office.       Juan Manuel Angeles

## (undated) NOTE — LETTER
Date: 4/4/2019    Patient Name: Maretta Bosworth          To Whom it may concern: This letter has been written at the patient's request. The above patient was seen at the [facility] for treatment of a medical condition.     This patient should b

## (undated) NOTE — LETTER
Linsey Stevens, [facility]:[birth year]    [address] [facility] [address]    1.  I authorize the performance upon Linsey Stevens  the following: COLPOSCOPY    2. I authorize Dr. David Koehler MD (and whomever is designated as the doctor’s assistant), to perform the abov _________________________________________ Date:___________     Physician Signature: _______________________________ Date:___________

## (undated) NOTE — ED AVS SNAPSHOT
[facility] [address]    [address]    Phone:  [phone]    Fax:  [phone]           Zoe Donald   MRN: [id number]    Department:  [facility] [address] [place] [address],[address]   Date of Visit:  1/12/2017 [address]  [phone] [address] [place] [address], [facility]   [phone]       To Check ER Wait Times:  TEXT 'Rosy Morin' to 8[address]14      Click [email or web address]      Or call [phone]    If you have phone number before you leave. After you leave, you should follow the attached instructions. I have read and understand the instructions given to me by my caregivers.         [facility]        Pharmacy Address Phone Number   [facility] [phone] access allows you to view health information for your child from their recent   visit, view other health information and more. To sign up or find more information on getting   Proxy Access to your child’s [employer] go to [email or web address]

## (undated) NOTE — LETTER
Date: 2/5/2020    Patient Name: Nisa Morales          To Whom it may concern: This letter has been written at the patient's request. The above patient was seen at the [facility] for treatment of a medical condition.     This patient should

## (undated) NOTE — LETTER
08/28/20      Regarding: Sakshi Miller    YOB: 2005      To whom it may concern. My patient Sakshi Miller was see today in our office. Please excuse her from school for this visit.       Sincerely,    Dr Vivian Busby

## (undated) NOTE — LETTER
Date & Time: 8/22/2019, 1:35 PM  Patient: Ruth Aguilar  Encounter Provider(s):    Dian Smith       To Whom It May Concern:    Ruth Aguilar was seen and treated in our department on 8/22/2019. She can return to school.     If you have any questions or

## (undated) NOTE — LETTER
[facility] IN [address]  Dept: [phone]  Dept Fax: [phone]         January 24, 2018    Patient: Silver Tamez   YOB: 2005   Date of Visit: 1/24/2018       To Whom It May Concern:

## (undated) NOTE — ED AVS SNAPSHOT
Parent/Legal Guardian Access to the Online [facility] Record of a Patient 15to 16Years Old  Return completed form by Secure email to [facility]: [email or web address].     Requirements and Procedures   Under [facility] MyChart ID and password with another person, that person may be able to view my or my child’s health information, and health information about someone who has authorized me as a MyChart proxy.    ·  I agree that it is my responsibility to select a confident Sign-Up Form and I agree to its terms.        Authorization Form     Please enter Patient’s information below:   Name (last, first, middle initial) __________________________________________   Gender  Male  Female    Last 4 Digits of Social Security Number Parent/Legal Guardian Signature                                  For Patient (1517 years of age)  I agree to allow my parent/legal guardian, named above, online access to my medical information currently available and that may become available as a result

## (undated) NOTE — ED AVS SNAPSHOT
[facility] in [address]    Phone:  [phone]    Fax:  [phone]           [facility]   MRN: [id number]    Department:  [facility] in [facility]   Date of Visit:  5/9/2017 [facility], [address]  [phone] [address]  [facility], [address] [place] [address]  [phone] [address] [place] [address]. [address]   [phone]       To Check ER Wait Times:  TEXT 'ERwait' to 26 reading, you will be contacted. Please make sure we have your correct phone number before you leave. After you leave, you should follow the attached instructions. I have read and understand the instructions given to me by my caregivers.         24-Hour XR FOOT, COMPLETE (MIN 3 VIEWS), RIGHT (CPT=73630) (Final result) Result time:  05/09/17 11:55:29    Final result    Impression:    CONCLUSION:  See above.               Dictated by: Blanca Romano MD on 5/09/2017 at 11:53       Approved by: Tyson Marie